# Patient Record
Sex: FEMALE | Race: WHITE | Employment: UNEMPLOYED | URBAN - METROPOLITAN AREA
[De-identification: names, ages, dates, MRNs, and addresses within clinical notes are randomized per-mention and may not be internally consistent; named-entity substitution may affect disease eponyms.]

---

## 2019-11-26 ENCOUNTER — APPOINTMENT (OUTPATIENT)
Dept: GENERAL RADIOLOGY | Age: 34
End: 2019-11-26

## 2019-11-26 ENCOUNTER — HOSPITAL ENCOUNTER (EMERGENCY)
Age: 34
Discharge: HOME | End: 2019-11-26
Attending: EMERGENCY MEDICINE

## 2019-11-26 ENCOUNTER — APPOINTMENT (OUTPATIENT)
Dept: CT IMAGING | Age: 34
End: 2019-11-26

## 2019-11-26 VITALS
HEIGHT: 64 IN | WEIGHT: 120 LBS | BODY MASS INDEX: 20.49 KG/M2 | DIASTOLIC BLOOD PRESSURE: 72 MMHG | HEART RATE: 95 BPM | RESPIRATION RATE: 20 BRPM | SYSTOLIC BLOOD PRESSURE: 127 MMHG | OXYGEN SATURATION: 100 % | TEMPERATURE: 98.8 F

## 2019-11-26 DIAGNOSIS — J18.9 PNEUMONIA DUE TO ORGANISM: Primary | ICD-10-CM

## 2019-11-26 LAB
BACTERIA: NEGATIVE /HPF
BILIRUBIN URINE: NEGATIVE MG/DL
BLOOD, URINE: NEGATIVE
CLARITY: ABNORMAL
COLOR: ABNORMAL
GLUCOSE, URINE: NEGATIVE MG/DL
INTERPRETATION: NORMAL
KETONES, URINE: NEGATIVE MG/DL
LEUKOCYTE ESTERASE, URINE: ABNORMAL
MUCUS: ABNORMAL HPF
NITRITE URINE, QUANTITATIVE: NEGATIVE
PH, URINE: 5 (ref 5–8)
PREGNANCY, URINE: NEGATIVE
PROTEIN UA: NEGATIVE MG/DL
RBC URINE: 2 /HPF (ref 0–6)
SPECIFIC GRAVITY UA: 1.02 (ref 1–1.03)
SPECIFIC GRAVITY, URINE: 1.02 (ref 1–1.03)
SQUAMOUS EPITHELIAL: 6 /HPF
TRICHOMONAS: ABNORMAL /HPF
UROBILINOGEN, URINE: NORMAL MG/DL (ref 0.2–1)
WBC UA: 2 /HPF (ref 0–5)

## 2019-11-26 PROCEDURE — 6360000002 HC RX W HCPCS: Performed by: PHYSICIAN ASSISTANT

## 2019-11-26 PROCEDURE — 81025 URINE PREGNANCY TEST: CPT

## 2019-11-26 PROCEDURE — 6370000000 HC RX 637 (ALT 250 FOR IP): Performed by: PHYSICIAN ASSISTANT

## 2019-11-26 PROCEDURE — 74176 CT ABD & PELVIS W/O CONTRAST: CPT

## 2019-11-26 PROCEDURE — 72100 X-RAY EXAM L-S SPINE 2/3 VWS: CPT

## 2019-11-26 PROCEDURE — 2580000003 HC RX 258: Performed by: PHYSICIAN ASSISTANT

## 2019-11-26 PROCEDURE — 81001 URINALYSIS AUTO W/SCOPE: CPT

## 2019-11-26 PROCEDURE — 99284 EMERGENCY DEPT VISIT MOD MDM: CPT

## 2019-11-26 PROCEDURE — 96374 THER/PROPH/DIAG INJ IV PUSH: CPT

## 2019-11-26 PROCEDURE — 96376 TX/PRO/DX INJ SAME DRUG ADON: CPT

## 2019-11-26 PROCEDURE — 71045 X-RAY EXAM CHEST 1 VIEW: CPT

## 2019-11-26 RX ORDER — AZITHROMYCIN 250 MG/1
TABLET, FILM COATED ORAL
Qty: 1 PACKET | Refills: 0 | Status: SHIPPED | OUTPATIENT
Start: 2019-11-26 | End: 2019-11-30

## 2019-11-26 RX ORDER — 0.9 % SODIUM CHLORIDE 0.9 %
1000 INTRAVENOUS SOLUTION INTRAVENOUS ONCE
Status: COMPLETED | OUTPATIENT
Start: 2019-11-26 | End: 2019-11-26

## 2019-11-26 RX ORDER — LIDOCAINE 4 G/G
1 PATCH TOPICAL ONCE
Status: DISCONTINUED | OUTPATIENT
Start: 2019-11-26 | End: 2019-11-26 | Stop reason: HOSPADM

## 2019-11-26 RX ORDER — KETOROLAC TROMETHAMINE 30 MG/ML
30 INJECTION, SOLUTION INTRAMUSCULAR; INTRAVENOUS ONCE
Status: COMPLETED | OUTPATIENT
Start: 2019-11-26 | End: 2019-11-26

## 2019-11-26 RX ORDER — ALBUTEROL SULFATE 90 UG/1
2 AEROSOL, METERED RESPIRATORY (INHALATION) EVERY 4 HOURS PRN
Qty: 1 INHALER | Refills: 1 | Status: SHIPPED | OUTPATIENT
Start: 2019-11-26 | End: 2019-12-26

## 2019-11-26 RX ADMIN — SODIUM CHLORIDE 1000 ML: 9 INJECTION, SOLUTION INTRAVENOUS at 17:17

## 2019-11-26 RX ADMIN — KETOROLAC TROMETHAMINE 30 MG: 30 INJECTION, SOLUTION INTRAMUSCULAR; INTRAVENOUS at 17:17

## 2019-11-26 RX ADMIN — KETOROLAC TROMETHAMINE 30 MG: 30 INJECTION, SOLUTION INTRAMUSCULAR; INTRAVENOUS at 19:22

## 2019-11-26 ASSESSMENT — ENCOUNTER SYMPTOMS
BACK PAIN: 1
COUGH: 1
ALLERGIC/IMMUNOLOGIC NEGATIVE: 1
GASTROINTESTINAL NEGATIVE: 1
EYES NEGATIVE: 1

## 2019-11-26 ASSESSMENT — PAIN SCALES - GENERAL
PAINLEVEL_OUTOF10: 7
PAINLEVEL_OUTOF10: 10
PAINLEVEL_OUTOF10: 10

## 2019-11-26 ASSESSMENT — PAIN DESCRIPTION - LOCATION: LOCATION: BACK

## 2020-08-09 ENCOUNTER — HOSPITAL ENCOUNTER (OUTPATIENT)
Age: 35
Setting detail: SPECIMEN
Discharge: HOME | End: 2020-08-09
Payer: COMMERCIAL

## 2020-08-09 PROCEDURE — U0002 COVID-19 LAB TEST NON-CDC: HCPCS

## 2020-08-11 LAB
SARS-COV-2: NOT DETECTED
SOURCE: NORMAL

## 2021-03-19 ENCOUNTER — HOSPITAL ENCOUNTER (EMERGENCY)
Age: 36
Discharge: HOME | End: 2021-03-19
Payer: MEDICARE

## 2021-03-19 ENCOUNTER — APPOINTMENT (OUTPATIENT)
Dept: GENERAL RADIOLOGY | Age: 36
End: 2021-03-19
Payer: MEDICARE

## 2021-03-19 VITALS
HEIGHT: 64 IN | SYSTOLIC BLOOD PRESSURE: 108 MMHG | TEMPERATURE: 98.3 F | BODY MASS INDEX: 20.49 KG/M2 | RESPIRATION RATE: 17 BRPM | OXYGEN SATURATION: 99 % | DIASTOLIC BLOOD PRESSURE: 69 MMHG | WEIGHT: 120 LBS | HEART RATE: 101 BPM

## 2021-03-19 DIAGNOSIS — E87.6 HYPOKALEMIA: ICD-10-CM

## 2021-03-19 DIAGNOSIS — T50.901A ACCIDENTAL DRUG OVERDOSE, INITIAL ENCOUNTER: Primary | ICD-10-CM

## 2021-03-19 LAB
ALBUMIN SERPL-MCNC: 4.1 GM/DL (ref 3.4–5)
ALP BLD-CCNC: 55 IU/L (ref 40–128)
ALT SERPL-CCNC: 11 U/L (ref 10–40)
ANION GAP SERPL CALCULATED.3IONS-SCNC: 11 MMOL/L (ref 4–16)
AST SERPL-CCNC: 14 IU/L (ref 15–37)
BASOPHILS ABSOLUTE: 0 K/CU MM
BASOPHILS RELATIVE PERCENT: 0.1 % (ref 0–1)
BILIRUB SERPL-MCNC: 0.4 MG/DL (ref 0–1)
BUN BLDV-MCNC: 17 MG/DL (ref 6–23)
CALCIUM SERPL-MCNC: 8.8 MG/DL (ref 8.3–10.6)
CHLORIDE BLD-SCNC: 100 MMOL/L (ref 99–110)
CHP ED QC CHECK: NORMAL
CO2: 26 MMOL/L (ref 21–32)
CREAT SERPL-MCNC: 1.1 MG/DL (ref 0.6–1.1)
DIFFERENTIAL TYPE: ABNORMAL
EOSINOPHILS ABSOLUTE: 0.1 K/CU MM
EOSINOPHILS RELATIVE PERCENT: 0.4 % (ref 0–3)
GFR AFRICAN AMERICAN: >60 ML/MIN/1.73M2
GFR NON-AFRICAN AMERICAN: 56 ML/MIN/1.73M2
GLUCOSE BLD-MCNC: 103 MG/DL
GLUCOSE BLD-MCNC: 103 MG/DL (ref 70–99)
GLUCOSE BLD-MCNC: 115 MG/DL (ref 70–99)
HCG QUALITATIVE: NEGATIVE
HCT VFR BLD CALC: 38.3 % (ref 37–47)
HEMOGLOBIN: 12.8 GM/DL (ref 12.5–16)
IMMATURE NEUTROPHIL %: 0.3 % (ref 0–0.43)
LYMPHOCYTES ABSOLUTE: 4.8 K/CU MM
LYMPHOCYTES RELATIVE PERCENT: 33.4 % (ref 24–44)
MCH RBC QN AUTO: 31.4 PG (ref 27–31)
MCHC RBC AUTO-ENTMCNC: 33.4 % (ref 32–36)
MCV RBC AUTO: 94.1 FL (ref 78–100)
MONOCYTES ABSOLUTE: 1.2 K/CU MM
MONOCYTES RELATIVE PERCENT: 8.2 % (ref 0–4)
PDW BLD-RTO: 13.1 % (ref 11.7–14.9)
PLATELET # BLD: 354 K/CU MM (ref 140–440)
PLT MORPHOLOGY: ABNORMAL
PMV BLD AUTO: 9.5 FL (ref 7.5–11.1)
POLYCHROMASIA: ABNORMAL
POTASSIUM SERPL-SCNC: 3.2 MMOL/L (ref 3.5–5.1)
PRO-BNP: 111.5 PG/ML
RBC # BLD: 4.07 M/CU MM (ref 4.2–5.4)
SEGMENTED NEUTROPHILS ABSOLUTE COUNT: 8.2 K/CU MM
SEGMENTED NEUTROPHILS RELATIVE PERCENT: 57.6 % (ref 36–66)
SODIUM BLD-SCNC: 137 MMOL/L (ref 135–145)
TOTAL IMMATURE NEUTOROPHIL: 0.04 K/CU MM
TOTAL PROTEIN: 7.6 GM/DL (ref 6.4–8.2)
TROPONIN T: <0.01 NG/ML
WBC # BLD: 14.3 K/CU MM (ref 4–10.5)
WBC # BLD: ABNORMAL 10*3/UL

## 2021-03-19 PROCEDURE — 99285 EMERGENCY DEPT VISIT HI MDM: CPT

## 2021-03-19 PROCEDURE — 2580000003 HC RX 258: Performed by: PHYSICIAN ASSISTANT

## 2021-03-19 PROCEDURE — 71045 X-RAY EXAM CHEST 1 VIEW: CPT

## 2021-03-19 PROCEDURE — 80053 COMPREHEN METABOLIC PANEL: CPT

## 2021-03-19 PROCEDURE — 85025 COMPLETE CBC W/AUTO DIFF WBC: CPT

## 2021-03-19 PROCEDURE — 93010 ELECTROCARDIOGRAM REPORT: CPT | Performed by: INTERNAL MEDICINE

## 2021-03-19 PROCEDURE — 93005 ELECTROCARDIOGRAM TRACING: CPT | Performed by: PHYSICIAN ASSISTANT

## 2021-03-19 PROCEDURE — 84484 ASSAY OF TROPONIN QUANT: CPT

## 2021-03-19 PROCEDURE — 96361 HYDRATE IV INFUSION ADD-ON: CPT

## 2021-03-19 PROCEDURE — 6370000000 HC RX 637 (ALT 250 FOR IP): Performed by: PHYSICIAN ASSISTANT

## 2021-03-19 PROCEDURE — 82962 GLUCOSE BLOOD TEST: CPT

## 2021-03-19 PROCEDURE — 83880 ASSAY OF NATRIURETIC PEPTIDE: CPT

## 2021-03-19 PROCEDURE — 84703 CHORIONIC GONADOTROPIN ASSAY: CPT

## 2021-03-19 PROCEDURE — 96360 HYDRATION IV INFUSION INIT: CPT

## 2021-03-19 RX ORDER — POTASSIUM CHLORIDE 20 MEQ/1
40 TABLET, EXTENDED RELEASE ORAL ONCE
Status: COMPLETED | OUTPATIENT
Start: 2021-03-19 | End: 2021-03-19

## 2021-03-19 RX ORDER — POTASSIUM CHLORIDE 750 MG/1
10 TABLET, EXTENDED RELEASE ORAL DAILY
Qty: 5 TABLET | Refills: 0 | Status: SHIPPED | OUTPATIENT
Start: 2021-03-19 | End: 2021-03-24

## 2021-03-19 RX ORDER — 0.9 % SODIUM CHLORIDE 0.9 %
1000 INTRAVENOUS SOLUTION INTRAVENOUS ONCE
Status: COMPLETED | OUTPATIENT
Start: 2021-03-19 | End: 2021-03-19

## 2021-03-19 RX ADMIN — SODIUM CHLORIDE 1000 ML: 9 INJECTION, SOLUTION INTRAVENOUS at 10:10

## 2021-03-19 RX ADMIN — POTASSIUM CHLORIDE 40 MEQ: 1500 TABLET, EXTENDED RELEASE ORAL at 14:32

## 2021-03-19 NOTE — ED PROVIDER NOTES
12 lead EKG per my interpretation:  Normal Sinus Rhythm at 87  Coleman is   Normal  QTc is  within an acceptable range  There is no specific T wave changes appreciated. There is no specific ST wave changes appreciated. No STEMI    Prior EKG to compare with was NOT available.     Carlos Figueroa MD  03/19/21 1037

## 2021-03-19 NOTE — ED PROVIDER NOTES
Father     Heart Disease Father      Social History     Socioeconomic History    Marital status:      Spouse name: Not on file    Number of children: Not on file    Years of education: Not on file    Highest education level: Not on file   Occupational History    Not on file   Social Needs    Financial resource strain: Not on file    Food insecurity     Worry: Not on file     Inability: Not on file    Transportation needs     Medical: Not on file     Non-medical: Not on file   Tobacco Use    Smoking status: Current Every Day Smoker     Packs/day: 1.00     Years: 20.00     Pack years: 20.00     Types: Cigarettes    Smokeless tobacco: Never Used   Substance and Sexual Activity    Alcohol use: No     Comment: 12 shots of Tequila today    Drug use: Yes     Frequency: 14.0 times per week     Types: IV, Opiates , Methamphetamines     Comment: heroin and meth    Sexual activity: Yes     Partners: Male   Lifestyle    Physical activity     Days per week: Not on file     Minutes per session: Not on file    Stress: Not on file   Relationships    Social connections     Talks on phone: Not on file     Gets together: Not on file     Attends Taoism service: Not on file     Active member of club or organization: Not on file     Attends meetings of clubs or organizations: Not on file     Relationship status: Not on file    Intimate partner violence     Fear of current or ex partner: Not on file     Emotionally abused: Not on file     Physically abused: Not on file     Forced sexual activity: Not on file   Other Topics Concern    Not on file   Social History Narrative    Not on file     Current Facility-Administered Medications   Medication Dose Route Frequency Provider Last Rate Last Admin    potassium chloride (KLOR-CON M) extended release tablet 40 mEq  40 mEq Oral Once Inge Rodriguez PA-C         Current Outpatient Medications   Medication Sig Dispense Refill    potassium chloride (KLOR-CON M) 10 MEQ extended release tablet Take 1 tablet by mouth daily for 5 days 5 tablet 0    albuterol sulfate HFA (PROVENTIL HFA) 108 (90 Base) MCG/ACT inhaler Inhale 2 puffs into the lungs every 4 hours as needed for Wheezing or Shortness of Breath With spacer (and mask if indicated). Thanks. 1 Inhaler 1    promethazine (PHENERGAN) 25 MG tablet Take 1 tablet by mouth every 6 hours as needed for Nausea 12 tablet 0    naproxen (NAPROSYN) 500 MG tablet Take 1 tablet by mouth 2 times daily as needed for Pain 20 tablet 0    clindamycin (CLEOCIN) 150 MG capsule Take 2 capsules by mouth 2 times daily Pharmacist: May substitute 150mg tabs and take 2 tabs per dose. 28 capsule 0     Allergies   Allergen Reactions    Methylprednisolone Swelling and Rash       Nursing Notes Reviewed  PHYSICAL EXAM    VITAL SIGNS: /65   Pulse 83   Temp 98.3 °F (36.8 °C) (Oral)   Resp 11   Ht 5' 4\" (1.626 m)   Wt 120 lb (54.4 kg)   LMP 02/26/2021   SpO2 100%   BMI 20.60 kg/m²    Constitutional:  Well developed, thin female, laying on bed with eyes closed, somnolent but easily arousable with verbal stimuli  Head:  Normocephalic, Atraumatic  Eyes:  EOMI. Sclera clear. Conjunctiva normal, No discharge. Neck/Lymphatics: Supple, no JVD, trachea is midline  Cardiovascular: Borderline tachycardic 102 bpm, regular rhythm. Normal S1/S2. No gross chest wall deformities bruising or discoloration. Equal symmetrical chest wall rise, no paradoxical chest wall movements or flail chest wall segments. No tenderness to the anterior posterior chest wall. Sternum is stable nontender. No tenderness or skin tenting over the clavicles. Peripheral Vascular: Distal pulses 2+, Capillary refill <2seconds  Respiratory:  Respirations nonnlabored, 94% on above on room air, speaking full sentences without difficulty. Clear to auscultation bilaterally, No retractions  Abdomen:   Bowel sounds normal in all quadrants, Soft, Non tender/Nondistended, No palpable abdominal masses. Musculoskeletal: BUE/BLE symmetrical without atrophy or deformities  Integument:  Warm, Dry, Intact, Skin turgor and texture normal  Neurologic:  Alert & oriented x3 , No focal deficits noted. Cranial nerves II through XII grossly intact. No slurred speech. No facial droop. Normal gross motor coordination & motor strength bilateral upper and lower extremities.  No gait ataxia  Psychiatric:  Affect appropriate      I have reviewed and interpreted all of the currently available lab results from this visit (if applicable):  Results for orders placed or performed during the hospital encounter of 03/19/21   CBC auto diff   Result Value Ref Range    WBC 14.3 (H) 4.0 - 10.5 K/CU MM    RBC 4.07 (L) 4.2 - 5.4 M/CU MM    Hemoglobin 12.8 12.5 - 16.0 GM/DL    Hematocrit 38.3 37 - 47 %    MCV 94.1 78 - 100 FL    MCH 31.4 (H) 27 - 31 PG    MCHC 33.4 32.0 - 36.0 %    RDW 13.1 11.7 - 14.9 %    Platelets 847 328 - 597 K/CU MM    MPV 9.5 7.5 - 11.1 FL    Immature Neutrophil % 0.3 0 - 0.43 %    Segs Relative 57.6 36 - 66 %    Eosinophils % 0.4 0 - 3 %    Basophils % 0.1 0 - 1 %    Lymphocytes % 33.4 24 - 44 %    Monocytes % 8.2 (H) 0 - 4 %    Total Immature Neutrophil 0.04 K/CU MM    Segs Absolute 8.2 K/CU MM    Eosinophils Absolute 0.1 K/CU MM    Basophils Absolute 0.0 K/CU MM    Lymphocytes Absolute 4.8 K/CU MM    Monocytes Absolute 1.2 K/CU MM    Differential Type       AUTOMATED DIFF RESULTS CONFIRMED BY SMEAR REVIEW  AUTOMATED DIFFERENTIAL      Polychromasia 1+     WBC Morphology OCCASIONAL     PLT Morphology FEW    CMP   Result Value Ref Range    Sodium 137 135 - 145 MMOL/L    Potassium 3.2 (L) 3.5 - 5.1 MMOL/L    Chloride 100 99 - 110 mMol/L    CO2 26 21 - 32 MMOL/L    BUN 17 6 - 23 MG/DL    CREATININE 1.1 0.6 - 1.1 MG/DL    Glucose 115 (H) 70 - 99 MG/DL    Calcium 8.8 8.3 - 10.6 MG/DL    Albumin 4.1 3.4 - 5.0 GM/DL    Total Protein 7.6 6.4 - 8.2 GM/DL    Total Bilirubin 0.4 0.0 - 1.0 MG/DL    ALT 11 10 - Patient presents with drug overdose and possible CPR in the field by bystanders. Patient is ambulatory through EMS doors on ED arrival, sleepy/somnolent but easily arousable to verbal stimuli. Providing her own HPI. Vitals are stable, borderline tachycardic, 94% on above on room air, speaking full sentences without difficulty. Lungs were clear equal auscultation. Abdomen soft nontender. No obvious chest wall injury or paradoxical chest wall movements. No chest wall tenderness. Regular rate and rhythm. Patient is placed on to telemetry and continuous pulse ox monitoring. Patient is protecting her own airway at this time. CBC with leukocytosis of 14.3 with normal white count. Normal hemoglobin. CMP reveals a mild hypokalemia 3.2, BNP, troponin within normal limits. Serum pregnancy is negative. Chest x-ray shows no evidence of a cardiopulmonary process or evidence of displaced rib fracture. EKG is also negative for evidence of acute ischemic changes. Patient was observed in the ED for over 3 hours, continues to be slightly sleepy and somnolent, protecting airway, 95% above on room air. She is easily arousable, talkative. She did tolerate a full meal tray and was amatory in the ED with a steady gait. At this time, do think patient is appropriate for discharge home outpatient follow-up. Will be given referral to [facility] for outpatient drug rehab. She is adamantly denying any intentional drug overdose for suicide attempt. She did not require any Narcan with EMS or while in the emergency department. Low clinical suspicion for malignant cardiac practice, pneumothorax/pneumothorax, ACS, hypoglycemia, sepsis. Tachycardia did resolve with IV fluids. Patient is discharged in stable condition with above follow-up. Given prescription for potassium for repletion, first oral dose given in the ED. Return warnings back to the ED discussed.       In light of current events, I did utilize appropriate PPE (including N95 face mask, safety glasses, gloves as recommended by the health facility/national standard best practice, during my bedside interactions with the patient. Patient was masked throughout ED course. Full droplet precaution as well as full PPE was followed throughout patient's ED course and evaluation. Attending physician - Dr. Lesia Hope - was consulted on this case, but did not independently evaluate the patient         Diagnosis and plan discussed in detail with patient who understands and agrees. Patient agrees to return emergency department if symptoms worsen or any new symptoms develop.       Disposition referral (if applicable):  Alannah Castrejon Bem [address]io [address]  Go to       [facility] Emergency Department  [address] [facility]  [phone]  Go to   As needed, If symptoms worsen    Carlos Fraser MD  [facility] [id number] [phone]    Schedule an appointment as soon as possible for a visit in 1 week  As needed, If symptoms worsen and to establish PCP care      Disposition medications (if applicable):  New Prescriptions    POTASSIUM CHLORIDE (KLOR-CON M) 10 MEQ EXTENDED RELEASE TABLET    Take 1 tablet by mouth daily for 5 days         (Please note that portions of this note may have been completed with a voice recognition program. Efforts were made to edit the dictations but occasionally words are mis-transcribed.)          Christiano Santiago PA-C  03/19/21 4736

## 2021-03-19 NOTE — ED NOTES
The patient is talking now and sts she was using meth and heroin today, and took 1200 gabapentin all at 0400 this morning.       Kael Lewis RN  03/19/21 1450

## 2021-03-19 NOTE — ED NOTES
Bed: ED-18  Expected date:   Expected time:   Means of arrival:   Comments:  Medic possible od, cpr per bystanders, pt alert and oriented with medics and spd     Husain Road, RN  03/19/21 6673

## 2021-03-23 LAB
EKG ATRIAL RATE: 87 BPM
EKG DIAGNOSIS: NORMAL
EKG P AXIS: 12 DEGREES
EKG P-R INTERVAL: 126 MS
EKG Q-T INTERVAL: 364 MS
EKG QRS DURATION: 96 MS
EKG QTC CALCULATION (BAZETT): 438 MS
EKG R AXIS: -10 DEGREES
EKG T AXIS: -4 DEGREES
EKG VENTRICULAR RATE: 87 BPM

## 2021-03-25 ENCOUNTER — HOSPITAL ENCOUNTER (EMERGENCY)
Age: 36
Discharge: HOME | End: 2021-03-26
Payer: COMMERCIAL
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
## 2021-03-25 PROCEDURE — 99285 EMERGENCY DEPT VISIT HI MDM: CPT

## 2021-03-25 PROCEDURE — 2720000011 HC SANE KIT SUPPLY STERILE

## 2021-03-26 VITALS
BODY MASS INDEX: 20.49 KG/M2 | SYSTOLIC BLOOD PRESSURE: 109 MMHG | RESPIRATION RATE: 14 BRPM | HEART RATE: 83 BPM | WEIGHT: 120 LBS | OXYGEN SATURATION: 98 % | HEIGHT: 64 IN | DIASTOLIC BLOOD PRESSURE: 72 MMHG | TEMPERATURE: 98.5 F

## 2021-03-26 NOTE — ED TRIAGE NOTES
Pt asks that someone \"look at her eyes\" because \"they are matted closed when I wake up in the morning\"
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
## 2021-10-25 ENCOUNTER — HOSPITAL ENCOUNTER (OUTPATIENT)
Age: 36
Setting detail: SPECIMEN
Discharge: HOME | End: 2021-10-25
Payer: MEDICARE

## 2021-10-25 LAB
SARS-COV-2: NOT DETECTED
SOURCE: NORMAL

## 2021-10-25 PROCEDURE — U0003 INFECTIOUS AGENT DETECTION BY NUCLEIC ACID (DNA OR RNA); SEVERE ACUTE RESPIRATORY SYNDROME CORONAVIRUS 2 (SARS-COV-2) (CORONAVIRUS DISEASE [COVID-19]), AMPLIFIED PROBE TECHNIQUE, MAKING USE OF HIGH THROUGHPUT TECHNOLOGIES AS DESCRIBED BY CMS-2020-01-R: HCPCS

## 2021-10-25 PROCEDURE — U0005 INFEC AGEN DETEC AMPLI PROBE: HCPCS

## 2021-12-27 ENCOUNTER — APPOINTMENT (OUTPATIENT)
Dept: GENERAL RADIOLOGY | Age: 36
End: 2021-12-27
Payer: MEDICARE

## 2021-12-27 ENCOUNTER — HOSPITAL ENCOUNTER (EMERGENCY)
Age: 36
Discharge: HOME | End: 2021-12-28
Payer: MEDICARE

## 2021-12-27 DIAGNOSIS — T50.901A ACCIDENTAL DRUG OVERDOSE, INITIAL ENCOUNTER: Primary | ICD-10-CM

## 2021-12-27 PROCEDURE — 99283 EMERGENCY DEPT VISIT LOW MDM: CPT

## 2021-12-27 PROCEDURE — 71045 X-RAY EXAM CHEST 1 VIEW: CPT

## 2021-12-27 ASSESSMENT — PAIN SCALES - GENERAL: PAINLEVEL_OUTOF10: 7

## 2021-12-28 VITALS
SYSTOLIC BLOOD PRESSURE: 116 MMHG | HEART RATE: 79 BPM | OXYGEN SATURATION: 97 % | DIASTOLIC BLOOD PRESSURE: 74 MMHG | TEMPERATURE: 97.1 F | RESPIRATION RATE: 14 BRPM

## 2021-12-28 LAB
EKG ATRIAL RATE: 69 BPM
EKG DIAGNOSIS: NORMAL
EKG P AXIS: -14 DEGREES
EKG P-R INTERVAL: 138 MS
EKG Q-T INTERVAL: 398 MS
EKG QRS DURATION: 98 MS
EKG QTC CALCULATION (BAZETT): 426 MS
EKG R AXIS: -17 DEGREES
EKG T AXIS: -4 DEGREES
EKG VENTRICULAR RATE: 69 BPM

## 2021-12-28 PROCEDURE — 93010 ELECTROCARDIOGRAM REPORT: CPT | Performed by: INTERNAL MEDICINE

## 2021-12-28 PROCEDURE — 93005 ELECTROCARDIOGRAM TRACING: CPT | Performed by: PHYSICIAN ASSISTANT
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
## 2021-12-28 NOTE — ED PROVIDER NOTES
EKG Interpretation    Interpreted by emergency department physician from Dec 28 at [address]: normal sinus   Rate: normal  Axis: left  Ectopy: Sinus arrhythmia  Conduction: Incomplete right bundle branch block  ST Segments: no acute change  T Waves: no acute change  Q Waves: none    Clinical Impression: Sinus arrhythmia with a rate of 69 and incomplete right bundle branch block, no ischemia or ectopy    MD Antelmo Wooten MD  12/28/21 7080

## 2021-12-28 NOTE — ED NOTES
Dr. Vidales contacted this RN, had been paged regarding critical phenytoin result from ACL lab for patient (phenytoin 38.7). Dr. Vidales would like this RN to contact patient at this time and assess for symptoms of toxicity including increased falls, nausea, vomiting, increased confusion, nystagmus. If any of those symptoms are present, patient should be instructed to go to ED, otherwise if patient is not experiencing those symptoms he should contact Dr. Floyd (physician who prescribes/manages patient's phenytoin) right away in the morning regarding dosing. Dr. Vidales's instructions read back by this RN and confirmed.     This RN contacted patient. Patient is not experiencing increased falls, nausea, vomiting, increased confusion, or nystagmus. States he has not had any development or worsening symptoms since office visit this morning - has been experiencing ongoing dizziness but again this has not worsened. Discussed with patient to continue to monitor for these symptoms overnight and if no changes overnight, instructed patient to contact Dr. Floyd when office opens in the morning to discuss phenytoin. Patient verified understanding. Encouraged patient to also call back overnight if he has further questions or concerns. Patient verified understanding.         Reason for Disposition  • Nursing judgment    Protocols used: NO GUIDELINE OR REFERENCE [id number]       Patients  attempted CPR on her, patient now experiencing chest pain that worsens with movement.       Philipp Flanagan RN  12/27/21 5396

## 2022-11-11 ENCOUNTER — HOSPITAL ENCOUNTER (OUTPATIENT)
Age: 37
Discharge: HOME | End: 2022-11-11
Payer: MEDICARE

## 2022-11-11 LAB
ALBUMIN SERPL-MCNC: 4.2 GM/DL (ref 3.4–5)
ALP BLD-CCNC: 59 IU/L (ref 40–129)
ALT SERPL-CCNC: 37 U/L (ref 10–40)
AST SERPL-CCNC: 35 IU/L (ref 15–37)
BILIRUB SERPL-MCNC: 0.7 MG/DL (ref 0–1)
BILIRUBIN DIRECT: 0.2 MG/DL (ref 0–0.3)
BILIRUBIN, INDIRECT: 0.5 MG/DL (ref 0–0.7)
HEPATITIS B SURFACE ANTIGEN: NON REACTIVE
HEPATITIS C ANTIBODY: ABNORMAL
TOTAL PROTEIN: 6.7 GM/DL (ref 6.4–8.2)

## 2022-11-11 PROCEDURE — 87389 HIV-1 AG W/HIV-1&-2 AB AG IA: CPT

## 2022-11-11 PROCEDURE — 80076 HEPATIC FUNCTION PANEL: CPT

## 2022-11-11 PROCEDURE — 36415 COLL VENOUS BLD VENIPUNCTURE: CPT

## 2022-11-11 PROCEDURE — 86592 SYPHILIS TEST NON-TREP QUAL: CPT

## 2022-11-11 PROCEDURE — 86803 HEPATITIS C AB TEST: CPT

## 2022-11-11 PROCEDURE — 86708 HEPATITIS A ANTIBODY: CPT

## 2022-11-11 PROCEDURE — 87340 HEPATITIS B SURFACE AG IA: CPT

## 2022-11-12 LAB — HIV SCREEN: NON REACTIVE

## 2022-11-13 LAB — HAV AB SERPL IA-ACNC: POSITIVE

## 2022-11-14 LAB — RPR: NON REACTIVE

## 2022-12-31 ENCOUNTER — APPOINTMENT (OUTPATIENT)
Dept: GENERAL RADIOLOGY | Age: 37
End: 2022-12-31
Payer: MEDICARE

## 2022-12-31 ENCOUNTER — HOSPITAL ENCOUNTER (EMERGENCY)
Age: 37
Discharge: HOME | End: 2022-12-31
Payer: MEDICARE

## 2022-12-31 VITALS
OXYGEN SATURATION: 98 % | RESPIRATION RATE: 14 BRPM | HEIGHT: 65 IN | HEART RATE: 52 BPM | SYSTOLIC BLOOD PRESSURE: 106 MMHG | DIASTOLIC BLOOD PRESSURE: 63 MMHG | WEIGHT: 155 LBS | TEMPERATURE: 97.9 F | BODY MASS INDEX: 25.83 KG/M2

## 2022-12-31 DIAGNOSIS — R07.9 CHEST PAIN, UNSPECIFIED TYPE: Primary | ICD-10-CM

## 2022-12-31 LAB
ALBUMIN SERPL-MCNC: 4.3 GM/DL (ref 3.4–5)
ALP BLD-CCNC: 53 IU/L (ref 40–129)
ALT SERPL-CCNC: 64 U/L (ref 10–40)
ANION GAP SERPL CALCULATED.3IONS-SCNC: 9 MMOL/L (ref 4–16)
AST SERPL-CCNC: 44 IU/L (ref 15–37)
BASOPHILS ABSOLUTE: 0 K/CU MM
BASOPHILS RELATIVE PERCENT: 0.4 % (ref 0–1)
BILIRUB SERPL-MCNC: 0.8 MG/DL (ref 0–1)
BUN BLDV-MCNC: 11 MG/DL (ref 6–23)
CALCIUM SERPL-MCNC: 9 MG/DL (ref 8.3–10.6)
CHLORIDE BLD-SCNC: 100 MMOL/L (ref 99–110)
CHP ED QC CHECK: YES
CO2: 25 MMOL/L (ref 21–32)
CREAT SERPL-MCNC: 0.7 MG/DL (ref 0.6–1.1)
DIFFERENTIAL TYPE: ABNORMAL
EKG ATRIAL RATE: 56 BPM
EKG DIAGNOSIS: NORMAL
EKG P AXIS: 36 DEGREES
EKG P-R INTERVAL: 126 MS
EKG Q-T INTERVAL: 402 MS
EKG QRS DURATION: 88 MS
EKG QTC CALCULATION (BAZETT): 387 MS
EKG R AXIS: -36 DEGREES
EKG T AXIS: 20 DEGREES
EKG VENTRICULAR RATE: 56 BPM
EOSINOPHILS ABSOLUTE: 0.1 K/CU MM
EOSINOPHILS RELATIVE PERCENT: 1.3 % (ref 0–3)
GFR SERPL CREATININE-BSD FRML MDRD: >60 ML/MIN/1.73M2
GLUCOSE BLD-MCNC: 102 MG/DL (ref 70–99)
HCT VFR BLD CALC: 41.7 % (ref 37–47)
HEMOGLOBIN: 13.6 GM/DL (ref 12.5–16)
IMMATURE NEUTROPHIL %: 0.2 % (ref 0–0.43)
LYMPHOCYTES ABSOLUTE: 4 K/CU MM
LYMPHOCYTES RELATIVE PERCENT: 49 % (ref 24–44)
MCH RBC QN AUTO: 30.3 PG (ref 27–31)
MCHC RBC AUTO-ENTMCNC: 32.6 % (ref 32–36)
MCV RBC AUTO: 92.9 FL (ref 78–100)
MONOCYTES ABSOLUTE: 0.7 K/CU MM
MONOCYTES RELATIVE PERCENT: 8.7 % (ref 0–4)
NUCLEATED RBC %: 0 %
PDW BLD-RTO: 13.9 % (ref 11.7–14.9)
PLATELET # BLD: 330 K/CU MM (ref 140–440)
PMV BLD AUTO: 9.9 FL (ref 7.5–11.1)
POTASSIUM SERPL-SCNC: 4.1 MMOL/L (ref 3.5–5.1)
PREGNANCY TEST URINE, POC: NEGATIVE
RBC # BLD: 4.49 M/CU MM (ref 4.2–5.4)
SEGMENTED NEUTROPHILS ABSOLUTE COUNT: 3.3 K/CU MM
SEGMENTED NEUTROPHILS RELATIVE PERCENT: 40.4 % (ref 36–66)
SODIUM BLD-SCNC: 134 MMOL/L (ref 135–145)
TOTAL IMMATURE NEUTOROPHIL: 0.02 K/CU MM
TOTAL NUCLEATED RBC: 0 K/CU MM
TOTAL PROTEIN: 7.5 GM/DL (ref 6.4–8.2)
TROPONIN T: <0.01 NG/ML
WBC # BLD: 8.2 K/CU MM (ref 4–10.5)

## 2022-12-31 PROCEDURE — 71045 X-RAY EXAM CHEST 1 VIEW: CPT

## 2022-12-31 PROCEDURE — 80053 COMPREHEN METABOLIC PANEL: CPT

## 2022-12-31 PROCEDURE — 84484 ASSAY OF TROPONIN QUANT: CPT

## 2022-12-31 PROCEDURE — 85025 COMPLETE CBC W/AUTO DIFF WBC: CPT

## 2022-12-31 PROCEDURE — 93005 ELECTROCARDIOGRAM TRACING: CPT | Performed by: PHYSICIAN ASSISTANT

## 2022-12-31 PROCEDURE — 93010 ELECTROCARDIOGRAM REPORT: CPT | Performed by: INTERNAL MEDICINE

## 2022-12-31 PROCEDURE — 99285 EMERGENCY DEPT VISIT HI MDM: CPT

## 2022-12-31 ASSESSMENT — PAIN DESCRIPTION - LOCATION: LOCATION: CHEST;BACK

## 2022-12-31 ASSESSMENT — PAIN SCALES - GENERAL: PAINLEVEL_OUTOF10: 4

## 2022-12-31 NOTE — ED PROVIDER NOTES
[address] ENCOUNTER        Pt Name: Mellisa Cason  MRN: [id number]  [place] 1985  Date of evaluation: 12/31/2022  Provider: Beatrice Henry PA-C  PCP: No primary care provider on file. Note Started: 10:19 AM EST      SAE. I have evaluated this patient. My supervising physician was available for consultation. Triage CHIEF COMPLAINT       Chief Complaint   Patient presents with    Chest Pain     States in upper back between shoulder blades. Been on and off for a couple months         HISTORY OF PRESENT ILLNESS   (Location/Symptom, Timing/Onset, Context/Setting, Quality, Duration, Modifying Factors, Severity)  Note limiting factors. Chief Complaint: Chest pain    Mellisa Cason is a 42 y.o. female who presents complaint of chest pain and mid scapular pain. She describes it as pressure. She states these have been ongoing intermittently over the past several months, mentioning that she has had no previous work-up for these, came in today with return of the symptoms. She feels last night it might of been also going up into her neck but otherwise she denies any radiation or migration. She denies any alleviating or aggravating factors, mentioning today she was just resting when the symptoms started. Denies any diaphoresis, nausea, vomiting, hemoptysis, URI symptoms, fever, abdominal pain, extremity pain or swelling    Nursing Notes were all reviewed and agreed with or any disagreements were addressed in the HPI. REVIEW OF SYSTEMS    (2-9 systems for level 4, 10 or more for level 5)     Review of Systems   Constitutional:  Negative for chills and fever. HENT:  Negative for congestion and rhinorrhea. Eyes:  Negative for pain and visual disturbance. Respiratory:  Negative for cough and shortness of breath. Cardiovascular:  Positive for chest pain. Negative for leg swelling.    Gastrointestinal: Negative for abdominal pain, diarrhea, nausea and vomiting. Genitourinary:  Negative for dysuria and hematuria. Musculoskeletal:  Negative for back pain and myalgias. Skin:  Negative for rash and wound. Neurological:  Negative for dizziness and light-headedness. PAST MEDICAL HISTORY     Past Medical History:   Diagnosis Date    Epilepsy [facility])        SURGICAL HISTORY     Past Surgical History:   Procedure Laterality Date    OVARY REMOVAL      R ovary    TUBAL LIGATION         CURRENTMEDICATIONS       Discharge Medication List as of 12/31/2022  1:02 PM        CONTINUE these medications which have NOT CHANGED    Details   potassium chloride (KLOR-CON M) 10 MEQ extended release tablet Take 1 tablet by mouth daily for 5 days, Disp-5 tablet, R-0Normal      albuterol sulfate HFA (PROVENTIL HFA) 108 (90 Base) MCG/ACT inhaler Inhale 2 puffs into the lungs every 4 hours as needed for Wheezing or Shortness of Breath With spacer (and mask if indicated). Thanks. , Disp-1 Inhaler, R-1Print      promethazine (PHENERGAN) 25 MG tablet Take 1 tablet by mouth every 6 hours as needed for Nausea, Disp-12 tablet, R-0Print      naproxen (NAPROSYN) 500 MG tablet Take 1 tablet by mouth 2 times daily as needed for Pain, Disp-20 tablet, R-0Print      clindamycin (CLEOCIN) 150 MG capsule Take 2 capsules by mouth 2 times daily Pharmacist: May substitute 150mg tabs and take 2 tabs per dose., Disp-28 capsule, R-0Print             ALLERGIES     Methylprednisolone    FAMILYHISTORY       Family History   Problem Relation Age of Onset    Cancer Father     Heart Disease Father         SOCIAL HISTORY       Social History     Socioeconomic History    Marital status:      Spouse name: None    Number of children: None    Years of education: None    Highest education level: None   Tobacco Use    Smoking status: Every Day     Packs/day: 1.00     Years: 20.00     Pack years: 20.00     Types: Cigarettes    Smokeless tobacco: Never   Vaping Use    Vaping Use: Never used   Substance and Sexual Activity    Alcohol use: No     Comment: 12 shots of Tequila today    Drug use: Yes     Frequency: 14.0 times per week     Types: IV, Opiates , Methamphetamines (Crystal Meth)     Comment: heroin and meth    Sexual activity: Yes     Partners: Male       SCREENINGS    Valerie Coma Scale  Eye Opening: Spontaneous  Best Verbal Response: Oriented  Best Motor Response: Obeys commands  [place] Coma Scale Score: 15        PHYSICAL EXAM    (up to 7 for level 4, 8 or more for level 5)     ED Triage Vitals [12/31/22 1002]   BP Temp Temp src Heart Rate Resp SpO2 Height Weight   124/72 97.9 °F (36.6 °C) -- 68 16 100 % 5' 4.5\" (1.638 m) 155 lb (70.3 kg)       Physical Exam  Vitals and nursing note reviewed. Constitutional:       Appearance: Normal appearance. She is well-developed. She is not ill-appearing or diaphoretic. HENT:      Head: Normocephalic and atraumatic. Eyes:      General: No scleral icterus. Right eye: No discharge. Left eye: No discharge. Cardiovascular:      Rate and Rhythm: Normal rate and regular rhythm. Heart sounds: Normal heart sounds. No murmur heard. No friction rub. No gallop. Pulmonary:      Effort: Pulmonary effort is normal. No respiratory distress. Breath sounds: Normal breath sounds. No stridor. No wheezing or rales. Chest:      Chest wall: No tenderness. Abdominal:      General: Bowel sounds are normal. There is no distension. Palpations: Abdomen is soft. There is no mass. Tenderness: There is no abdominal tenderness. There is no guarding or rebound. Musculoskeletal:         General: No tenderness. Normal range of motion. Cervical back: Normal range of motion and neck supple. Skin:     General: Skin is warm and dry. Coloration: Skin is not pale. Neurological:      Mental Status: She is alert and oriented to person, place, and time.       Coordination: Coordination normal. Psychiatric:         Mood and Affect: Mood normal.         Behavior: Behavior normal.       EMERGENCY DEPARTMENT COURSE:             Pt is a 42 y.o. female who presents with above HPI. Saw patient shortly after arrival to exam room she is alert she is oriented no acute distress. Vitals are within normal limits. She is describing some intermittent chest pain, though previous work-up. Emergent conditions considered. We will plan for chest pain work-up. Patient is PERC negative and low risk for PE per Wells criteria. Initial EKG today no acute findings. plan for blood work, ASA, chest x-ray . Patient declines anything for her comfort at this point    Reviewing EMR, patient has history of subs abuse disorder overdose. Again today she is alert and oriented does not appear to be intoxicated. Denies any SIs, HI's, and she is describing some \"brain shocks\", but denies any headache, and on examination no focal neurodeficits. Patient was given thefollowing medications:  Medications - No data to display        DIFFERENTIAL DIAGNOSIS/MDM:       Patient's work-up is unremarkable. Chest x-ray unremarkable. Normal troponin. Patient is PERC negative and low risk for PE per Wells criteria. Low risk heart score. Symptoms have been ongoing for months. This time, feel she safe for outpatient management. I discussed this with her and she is agreeable. PCP and cardiology resources provided. MDM:  Patient presented with chest pain. Given history and presentation cardiac workup initiated. Patient had labs, EKG, x-ray and troponin ordered. Patient was placed on monitor. Patient's pulse ox is normal.      HEART Score: 1    Patient's chest x-ray is read by radiology as negative for acute abnormality    The patient's initial troponin is within the normal range. Patient's EKG did not show any acute diagnostic ischemic changes. Patient does not have any acute hemodynamic instability.     I completed a HEART PATHWAY to screen for Acute Coronary Syndrome (ACS) in this patient. The evidence indicates that the patient is very low risk for ACS and this is consistent with my clinical intuition. The risk of further workup or hospitalization is likely higher than the risk of the patient having an ACS. It is, therefore, in the patients best interest not to do additional emergent testing or be hospitalized  Comment: Please note this report has been produced using speech recognition software and may contain errors related to that system including errors in grammar, punctuation, and spelling, as well as words and phrases that may be inappropriate. Efforts were made to edit the dictations. Is this patient to be included in the SEP-1 Core Measure due to severe sepsis or septic shock? No   Exclusion criteria - the patient is NOT to be included for SEP-1 Core Measure due to:  2+ SIRS criteria are not met    Vitals:    Vitals:    12/31/22 1002 12/31/22 1005 12/31/22 1006 12/31/22 1302   BP: 124/72 124/72  106/63   Pulse: 68  70 52   Resp: 16  17 14   Temp: 97.9 °F (36.6 °C)      SpO2: 100% 98% 100% 98%   Weight: 155 lb (70.3 kg)      Height: 5' 4.5\" (1.638 m)          CONSULTS:   None     CRITICAL CARE TIME   N/A      DIAGNOSTIC RESULTS   LABS:    Labs Reviewed   CBC WITH AUTO DIFFERENTIAL - Abnormal; Notable for the following components:       Result Value    Lymphocytes % 49.0 (*)     Monocytes % 8.7 (*)     All other components within normal limits   COMPREHENSIVE METABOLIC PANEL - Abnormal; Notable for the following components:    Sodium 134 (*)     Glucose 102 (*)     ALT 64 (*)     AST 44 (*)     All other components within normal limits   POCT URINE PREGNANCY - Normal   TROPONIN       When ordered, only abnormal lab results are displayed. All other labs were within normal range or not returned as of this dictation. EKG:  When ordered, EKG's are interpreted by the Emergency Department Physician in the absence of a cardiologist.  Please see their note for interpretation of EKG. RADIOLOGY:   Non-plain film images such as CT, Ultrasound and MRI are read by the radiologist. Plain radiographic images are visualized andpreliminarily interpreted by the  ED Provider with the below findings:      Interpretation perthe Radiologist below, if available at the time of this note:    XR CHEST PORTABLE   Final Result   No acute process. No results found. PROCEDURES   Unless otherwise noted below, none     Procedures      FINAL IMPRESSION      1.  Chest pain, unspecified type          DISPOSITION/PLAN   DISPOSITION Decision To Discharge 12/31/2022 01:10:13 PM      PATIENT REFERREDTO:  Rohini Choe MD  [address]     In 1 week  For reevaluation    Yosi Ruvalcaba MD  [address]  [phone]    In 1 week  For reevaluation    DISCHARGE MEDICATIONS:  Discharge Medication List as of 12/31/2022  1:02 PM          DISCONTINUED MEDICATIONS:  Discharge Medication List as of 12/31/2022  1:02 PM                 (Please note that portions ofthis note were completed with a voice recognition program.  Efforts were made to edit the dictations but occasionally words are mis-transcribed.)    Dragan Nugent PA-C (electronically signed)             Dragan Nugent PA-C  01/01/23 2051

## 2022-12-31 NOTE — DISCHARGE INSTRUCTIONS
Yes, follow-up with a primary care provider and/or the cardiologist for reevaluation of your symptoms discuss your visit to the emergency department. Return with any difficulty breathing, passing out, coughing up blood, fever, worsening symptoms or any new concerns.

## 2022-12-31 NOTE — ED PROVIDER NOTES
EKG interpreted by me:  Sinus bradycardia with sinus arrhythmia, rate of 56 bpm, normal intervals. Otherwise normal EKG.   No previous to compare     Marily Acosta MD  12/31/22 1013

## 2023-01-01 ASSESSMENT — ENCOUNTER SYMPTOMS
DIARRHEA: 0
NAUSEA: 0
BACK PAIN: 0
RHINORRHEA: 0
COUGH: 0
VOMITING: 0
ABDOMINAL PAIN: 0
EYE PAIN: 0
SHORTNESS OF BREATH: 0

## 2023-07-27 ENCOUNTER — HOSPITAL ENCOUNTER (OUTPATIENT)
Dept: PSYCHIATRY | Age: 38
Setting detail: THERAPIES SERIES
Discharge: HOME | End: 2023-07-27
Payer: MEDICAID

## 2023-07-27 PROCEDURE — 90791 PSYCH DIAGNOSTIC EVALUATION: CPT

## 2023-07-27 PROCEDURE — 80305 DRUG TEST PRSMV DIR OPT OBS: CPT

## 2023-07-27 ASSESSMENT — ANXIETY QUESTIONNAIRES
4. TROUBLE RELAXING: 2
7. FEELING AFRAID AS IF SOMETHING AWFUL MIGHT HAPPEN: 3
5. BEING SO RESTLESS THAT IT IS HARD TO SIT STILL: 0
IF YOU CHECKED OFF ANY PROBLEMS ON THIS QUESTIONNAIRE, HOW DIFFICULT HAVE THESE PROBLEMS MADE IT FOR YOU TO DO YOUR WORK, TAKE CARE OF THINGS AT HOME, OR GET ALONG WITH OTHER PEOPLE: EXTREMELY DIFFICULT
1. FEELING NERVOUS, ANXIOUS, OR ON EDGE: 3
6. BECOMING EASILY ANNOYED OR IRRITABLE: 3
3. WORRYING TOO MUCH ABOUT DIFFERENT THINGS: 3
2. NOT BEING ABLE TO STOP OR CONTROL WORRYING: 3
GAD7 TOTAL SCORE: 17

## 2023-07-27 ASSESSMENT — PATIENT HEALTH QUESTIONNAIRE - PHQ9: SUM OF ALL RESPONSES TO PHQ QUESTIONS 1-9: 15
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
## 2023-08-02 ENCOUNTER — HOSPITAL ENCOUNTER (OUTPATIENT)
Dept: PSYCHIATRY | Age: 38
Setting detail: THERAPIES SERIES
Discharge: HOME | End: 2023-08-02

## 2023-08-02 NOTE — GROUP NOTE
[facility] Group Therapy Note      8/2/2023    Location:  [place]      Clients Presents: 5934, 1339    Clients Absent: 1495, 1480    Length of session: 1.5 hours    Group Note: OP    Group Type: Women's    New members were welcomed and introduced. Norms and expectations of group were discussed. Content: Counselor presented a topic focused discussion on types of relationships. Client identified the difference between casual and personal relationships. Client identified characteristics of unhealthy vs. Healthy relationships. Client kodak a relationship diagram with the people she considers to be closest and most important for recovery.       Ulices Davies [address]  8/2/2023 5:30 PM    Co-Therapist: N/A      [facility] Individual Group Progress Note    Timothy Rebollar  1985 8/2/2023    Notes on Client Progress in Group    Reason for Absence: DNS     Ulices Davies [address]  8/2/2023 5:09 PM    Co-Therapist: N/A

## 2023-08-03 ENCOUNTER — HOSPITAL ENCOUNTER (OUTPATIENT)
Dept: PSYCHIATRY | Age: 38
Setting detail: THERAPIES SERIES
Discharge: HOME | End: 2023-08-03

## 2023-08-03 NOTE — PROGRESS NOTES
[facility]        Individual  Progress Note    Location: [x] [place] [] [facility]                   Patient Name: Megan Ojeda   : 1985     Case # :  [id number]  Therapist: JOSE Urban        Objective/Service/Time:     Client did not show for her second part of her assessment. A letter will be sent.          Electronically signed by Zora Patiño on 8/3/2023 at 2:14 PM

## 2023-08-06 ENCOUNTER — APPOINTMENT (OUTPATIENT)
Dept: CT IMAGING | Age: 38
End: 2023-08-06
Payer: MEDICAID

## 2023-08-06 ENCOUNTER — HOSPITAL ENCOUNTER (EMERGENCY)
Age: 38
Discharge: HOME | End: 2023-08-06
Attending: EMERGENCY MEDICINE
Payer: MEDICAID

## 2023-08-06 VITALS
RESPIRATION RATE: 16 BRPM | TEMPERATURE: 97.9 F | HEART RATE: 68 BPM | SYSTOLIC BLOOD PRESSURE: 124 MMHG | DIASTOLIC BLOOD PRESSURE: 78 MMHG | OXYGEN SATURATION: 97 %

## 2023-08-06 DIAGNOSIS — R07.9 CHEST PAIN, UNSPECIFIED TYPE: ICD-10-CM

## 2023-08-06 DIAGNOSIS — F41.1 ANXIETY STATE: ICD-10-CM

## 2023-08-06 DIAGNOSIS — S00.83XA CONTUSION OF FACE, INITIAL ENCOUNTER: ICD-10-CM

## 2023-08-06 DIAGNOSIS — S09.90XA CLOSED HEAD INJURY, INITIAL ENCOUNTER: Primary | ICD-10-CM

## 2023-08-06 LAB
EKG ATRIAL RATE: 55 BPM
EKG DIAGNOSIS: NORMAL
EKG P AXIS: 30 DEGREES
EKG P-R INTERVAL: 170 MS
EKG Q-T INTERVAL: 412 MS
EKG QRS DURATION: 92 MS
EKG QTC CALCULATION (BAZETT): 394 MS
EKG R AXIS: -20 DEGREES
EKG T AXIS: 15 DEGREES
EKG VENTRICULAR RATE: 55 BPM

## 2023-08-06 PROCEDURE — 71260 CT THORAX DX C+: CPT

## 2023-08-06 PROCEDURE — 70498 CT ANGIOGRAPHY NECK: CPT

## 2023-08-06 PROCEDURE — 99285 EMERGENCY DEPT VISIT HI MDM: CPT

## 2023-08-06 PROCEDURE — 70450 CT HEAD/BRAIN W/O DYE: CPT

## 2023-08-06 PROCEDURE — 6360000004 HC RX CONTRAST MEDICATION: Performed by: EMERGENCY MEDICINE

## 2023-08-06 PROCEDURE — 6370000000 HC RX 637 (ALT 250 FOR IP): Performed by: EMERGENCY MEDICINE

## 2023-08-06 PROCEDURE — 93005 ELECTROCARDIOGRAM TRACING: CPT | Performed by: EMERGENCY MEDICINE

## 2023-08-06 PROCEDURE — 6360000002 HC RX W HCPCS: Performed by: EMERGENCY MEDICINE

## 2023-08-06 PROCEDURE — 93010 ELECTROCARDIOGRAM REPORT: CPT | Performed by: INTERNAL MEDICINE

## 2023-08-06 PROCEDURE — 96374 THER/PROPH/DIAG INJ IV PUSH: CPT

## 2023-08-06 RX ORDER — CYCLOBENZAPRINE HCL 10 MG
10 TABLET ORAL 3 TIMES DAILY PRN
Qty: 21 TABLET | Refills: 0 | Status: SHIPPED | OUTPATIENT
Start: 2023-08-06 | End: 2023-08-16

## 2023-08-06 RX ORDER — LORAZEPAM 1 MG/1
1 TABLET ORAL ONCE
Status: COMPLETED | OUTPATIENT
Start: 2023-08-06 | End: 2023-08-06

## 2023-08-06 RX ORDER — LIDOCAINE 50 MG/G
1 PATCH TOPICAL DAILY
Qty: 30 PATCH | Refills: 0 | Status: SHIPPED | OUTPATIENT
Start: 2023-08-06 | End: 2023-09-05

## 2023-08-06 RX ORDER — KETOROLAC TROMETHAMINE 15 MG/ML
15 INJECTION, SOLUTION INTRAMUSCULAR; INTRAVENOUS ONCE
Status: COMPLETED | OUTPATIENT
Start: 2023-08-06 | End: 2023-08-06

## 2023-08-06 RX ORDER — KETOROLAC TROMETHAMINE 10 MG/1
10 TABLET, FILM COATED ORAL EVERY 6 HOURS PRN
Qty: 20 TABLET | Refills: 0 | Status: SHIPPED | OUTPATIENT
Start: 2023-08-06 | End: 2023-08-11

## 2023-08-06 RX ADMIN — KETOROLAC TROMETHAMINE 15 MG: 15 INJECTION, SOLUTION INTRAMUSCULAR; INTRAVENOUS at 02:56

## 2023-08-06 RX ADMIN — LORAZEPAM 1 MG: 1 TABLET ORAL at 03:20

## 2023-08-06 RX ADMIN — IOPAMIDOL 75 ML: 755 INJECTION, SOLUTION INTRAVENOUS at 03:16

## 2023-08-06 NOTE — ED NOTES
Pt asking if she can go outside to smoke at this time, this RN offered nicotine patch or lozenges. Pt refused, will alert this RN if she would like in the future.       Heriberto Garcia RN  08/06/23 8012
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
## 2023-08-06 NOTE — ED PROVIDER NOTES
the skin daily 12 hours on, 12 hours off. Ok to substitute for 4% if not covered by insurance       Comment: Please note this report has been produced using speech recognition software and may contain errors related to that system including errors in grammar, punctuation, and spelling, as well as words and phrases that may be inappropriate. If there are any questions or concerns please feel free to contact the dictating provider for clarification.        Sobia Harry MD  08/06/23 5960
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
## 2023-08-09 ENCOUNTER — HOSPITAL ENCOUNTER (OUTPATIENT)
Dept: PSYCHIATRY | Age: 38
Setting detail: THERAPIES SERIES
Discharge: HOME | End: 2023-08-09
Payer: MEDICAID

## 2023-08-09 PROCEDURE — 90853 GROUP PSYCHOTHERAPY: CPT
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
## 2023-08-10 ENCOUNTER — HOSPITAL ENCOUNTER (OUTPATIENT)
Dept: PSYCHIATRY | Age: 38
Setting detail: THERAPIES SERIES
Discharge: HOME | End: 2023-08-10
Payer: MEDICAID

## 2023-08-10 PROCEDURE — 90834 PSYTX W PT 45 MINUTES: CPT
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
## 2023-08-14 ENCOUNTER — HOSPITAL ENCOUNTER (OUTPATIENT)
Dept: PSYCHIATRY | Age: 38
Setting detail: THERAPIES SERIES
Discharge: HOME | End: 2023-08-14
Payer: MEDICAID

## 2023-08-14 NOTE — PROGRESS NOTES
500 [facility]        Individual  Progress Note    Location: [x] [place] [] [facility]                   Patient Name: Estephania Altamirano   : 1985     Case # :  [id number]  Therapist: JOSE Avila        Objective/Service/Time: case management    Counselor called client to inquire about insurance change and Vivitrol shot appointment. Client reports Kael Salgado will not go into effect until 2023. She has Naltrexone pills and will take them until she can get the Vivitrol shot at the [facility] with her [employer]. Client reports she will be fine, stating, \"The pills work and I have enough to last until I can get the shot\".          Electronically signed by Leora Garza on 2023 at 10:00 AM

## 2023-08-16 ENCOUNTER — HOSPITAL ENCOUNTER (OUTPATIENT)
Dept: PSYCHIATRY | Age: 38
Setting detail: THERAPIES SERIES
Discharge: HOME | End: 2023-08-16
Payer: MEDICAID

## 2023-08-16 PROCEDURE — 90853 GROUP PSYCHOTHERAPY: CPT
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
## 2023-08-17 ENCOUNTER — HOSPITAL ENCOUNTER (OUTPATIENT)
Dept: PSYCHIATRY | Age: 38
Setting detail: THERAPIES SERIES
Discharge: HOME | End: 2023-08-17
Payer: MEDICAID

## 2023-08-17 PROCEDURE — 80305 DRUG TEST PRSMV DIR OPT OBS: CPT

## 2023-08-17 PROCEDURE — 90834 PSYTX W PT 45 MINUTES: CPT

## 2023-08-17 NOTE — PROGRESS NOTES
500 [facility]        Individual  Progress Note    Location: [x] [place] [] [facility]                   Patient Name: Anthony Angulo   : 1985     Case # :  [id number]  Therapist: JOSE Lomeli        Objective/Service/Time: kept 1 hour individual     Goal 1 Objective 1 achieved    S-Client is a 42year old  female on probation. Client denies any use or cravings. She shared she is still taking Naltrexone daily. She reports she is living at the [facility] and her  is still with her acting like nothing happened. She stated, \"Yesterday after group I got back and we picked up my grandson and took him to ride his dirt bike. He just thinks we are fine. He believes things are fine. I told him we needed to go to counseling or something. He told me to make the call and he would go\". Client and counselor explored negative consequences of her AoD use in her life. She stated, \"The biggest consequence is my kid not talking to me. She just started to talk to me a couple weeks ago. I lost trust from my whole family. I never had any money. I sold any and everything I had to get drugs. I spent 13 months locked up. I have Hep C and my teeth are bad\". Client shared she had been to NA and AA meetings in the past but reports she was triggered by NA meetings. Client shared she needs mental health medication refills and was referred to and signed an RUBEN for [address]. Client also received information for obtaining her SS card. O-Client was cooperative, pleasant and oriented x 4. A-Client has good insight into her AoD use and consequences. She is taking medication orally until her insurance gets changed to [employer] and she can receive a Vivitrol shot. Client has not engaged in 12 step meetings yet but has been encouraged to do so and build more sober support.  Client was given pamphlets for [facility] program for men and women of
challenge, and replace destructive, high risk self-talk with positive strength building self-talk in 90 days. Evaluation Date: 11/10/2023 Code: C Continue TBD      3)  Utilize, if needed case management services provided by [facility] to enhance abstaining from substance use Evaluation Date: 11/10/2023 Code: C Continue TBD         3. Problem: History of Relapse resulting in emotional upset with self and family relationships. Goal: Client will identify and address the core dynamics and dilemmas that are perpetuating consequences and exacerbate relapses and triggers and in 90 days      b. Objectives:   1)  Client will identify 3-5 sober support person to contact weekly to share her thoughts and feelings to avoid relapse in 90 days Evaluation Date: 11/10/2023 Code: C Continue TBD     2) Client will enhance 4 to 8 healthy techniques and coping skills, relapse prevention in 90 days Evaluation Date: 11/10/2023 Code: C Continue TBD    3) Client will attend 1-2 AA/NA meetings Biweekly in 90 days. Evaluation Date: 11/10/2023 Code: C Continue TBD    Defer: Client would like to get her own place to live. Discharge Plan/Instructions: Client will remain sober, continue receiving Vivitrol shot and complete treatment plan goals and objectives. Sandoval Ponce 1985 has participated in the treatment plan development outlined above on 8/17/2023.      Art Mccullough LCDCIII  8/17/2023/2:05 PM

## 2023-08-23 ENCOUNTER — HOSPITAL ENCOUNTER (OUTPATIENT)
Dept: PSYCHIATRY | Age: 38
Setting detail: THERAPIES SERIES
Discharge: HOME | End: 2023-08-23
Payer: MEDICAID

## 2023-08-23 PROCEDURE — 90853 GROUP PSYCHOTHERAPY: CPT

## 2023-08-23 NOTE — GROUP NOTE
500 [facility] Therapy Note      8/23/2023    Location:  [place]      Clients Presents: 1647, 2123, [address] [facility]    Clients Absent: 1480, 1339, 1500    Length of session: 1.5 hours    Group Note: OP    Group Type: Women's    New members were welcomed and introduced. Norms and expectations of group were discussed. Content: Counselor facilitated client check in information focusing on life's stressors and coping skills to avoid relapse. Belle Ayers, [address]  8/23/2023 5:30 PM    Co-Therapist: N/A      [facility] Individual Group Progress Note    Gabby Cruz  1985 8/23/2023    Notes on Client Progress in Group    Client reports she is making progress on her goals in treatment. She denies any use or cravings. She reports she is still taking Naltrexone daily. Client participated in group discussion and shared she is stressed trying to find either a place to rent or a place to park her RV. She is not having any luck and her PO said she needs to be out of the [facility] by September 15th. She is trying to stay in the day and do what she can to call Landlords and keep looking.      Jeet [address]  8/23/2023 5:30 PM    Co-Therapist: N/A

## 2023-08-24 ENCOUNTER — HOSPITAL ENCOUNTER (OUTPATIENT)
Dept: PSYCHIATRY | Age: 38
Setting detail: THERAPIES SERIES
Discharge: HOME | End: 2023-08-24
Payer: MEDICAID

## 2023-08-24 PROCEDURE — 90834 PSYTX W PT 45 MINUTES: CPT

## 2023-08-24 NOTE — PROGRESS NOTES
[address] [facility] [address] TREATMENT PLAN      Location: [x] [place] [] [facility]    Treatment plan: Initial    Strengths: smart, funny    Weakness/Limitations: isolating self     Service/Frequency/Duration: Individual 1 a week for 90 days, Group assigned 1 a week for 90 days, Urinalysis Random for 90 days, AA/NA 1-2 biweekly for 90 days, and Case Management as needed for 90 days    Diagnosis: F11.20 Opioid dependence-unspecified use F15.20 Amphetamine and other stimulant dependence-unspecified use    Level of Care: 1 Outpatient Services      Problem: History of Substance use resulting robbery charges in 2020 and now being on community control after prison release. Goal: Client will enhance personalized knowledge and insight associated with mood altering substances in 90 days   Objectives:   1) Client will remind herself of detrimental consequences in major life areas regarding AoD use in 90 days Evaluation Date: 11/10/2023 Code: Achieved 8/17/2023 Client reports her relationships were affected, her children wouldn't talk to her or trust her, she was always broke and sold everything due to drugs, I spent 13 months locked up, my health was affected I have Hep C and I am homeless. 2) Client will identify 4 to 8 benefits and gratitude's due to remaining substance free in 90 days: Evaluation Date: 11/10/2023 Code: C Continue TBD     3) Client will list instances when addiction has led to relationship conflicts and have led to addictive behavior in 90 days and Evaluation Date: 11/10/2023 Code: Achieved 8/24/2023 Client reports addiction caused lying, cheating, stealing from stores, and her  and her fighting about her use. 2.    Problem: Low Self-Esteem/Identify issues as a result of her self-medicating.      Goal: Client will learn to focus on her character strengths and feel better about herself in 90 days      Objectives:   1) Client will journal 1-2 times weekly thoughts and feelings and share how this aids
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
## 2023-08-30 ENCOUNTER — HOSPITAL ENCOUNTER (OUTPATIENT)
Dept: PSYCHIATRY | Age: 38
Setting detail: THERAPIES SERIES
Discharge: HOME | End: 2023-08-30
Payer: MEDICAID

## 2023-08-30 PROCEDURE — 90853 GROUP PSYCHOTHERAPY: CPT
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
## 2023-08-31 ENCOUNTER — HOSPITAL ENCOUNTER (OUTPATIENT)
Dept: PSYCHIATRY | Age: 38
Setting detail: THERAPIES SERIES
Discharge: HOME | End: 2023-08-31
Payer: MEDICAID

## 2023-08-31 NOTE — PROGRESS NOTES
500 [facility]        Individual  Progress Note    Location: [x] [place] [] [facility]                   Patient Name: Susy Ye   : 1985     Case # :  [id number]  Therapist: JOSE Schaeffer        Objective/Service/Time:     Client did not show for her session. Counselor called client and left a voice message. Counselor remembered yesterday at group client mentioned having to attend a  either Thursday or Friday? I am marking her as a cancellation.          Electronically signed by Linda Mao on 2023 at 2:46 PM

## 2023-09-06 ENCOUNTER — HOSPITAL ENCOUNTER (OUTPATIENT)
Dept: PSYCHIATRY | Age: 38
Setting detail: THERAPIES SERIES
Discharge: HOME | End: 2023-09-06
Payer: MEDICAID

## 2023-09-06 PROCEDURE — 90853 GROUP PSYCHOTHERAPY: CPT

## 2023-09-06 NOTE — GROUP NOTE
500 [facility] Group Therapy Note      9/6/2023    Location:  [place]      Clients Presents: 8363, 1336, 1463, 1495, 1505    Clients Absent: 1480, 6698, 1477    Length of session: 1.5 hours    Group Note: OP    Group Type: Women's    New members were welcomed and introduced. Norms and expectations of group were discussed. Content: Counselor presented a topic focused discussion on living life on life's terms. Client shared a daily struggle and a coping skill to help her move through the struggle. Client offered feedback and support to her peers. Jordon Mckeon  9/6/2023 5:30 PM    Co-Therapist: N/A      [facility] Individual Group Progress Note    Pattie Kee  1985 9/6/2023    Notes on Client Progress in Group    Client shared she is making progress and has found a place to live. She is struggling with her marriage but stated \"it nothing new\". Client had to go to the hospital for her daughter having twins prematurely.      Jordon Mckeon  9/6/2023 5:41 PM    Co-Therapist: N/A

## 2023-09-07 ENCOUNTER — HOSPITAL ENCOUNTER (OUTPATIENT)
Dept: PSYCHIATRY | Age: 38
Setting detail: THERAPIES SERIES
Discharge: HOME | End: 2023-09-07
Payer: MEDICAID

## 2023-09-07 NOTE — PROGRESS NOTES
500 [facility]        Individual  Progress Note    Location: [x] [place] [] [facility]                   Patient Name: Jeb Carreon   : 1985     Case # :  [id number]  Therapist: Carlen Phoenix, LCDCIII        Objective/Service/Time:     Client called to cancel her individual session today. Client reports her pregnant daughter has gone into  labor and [facility] is transferring her to [facility]. Her daughter is pregnant with twins and she is only 27 weeks pregnant. Client reports she is willing to come in tomorrow if there is an opening. At this time schedule is full.          Electronically signed by Ev Ochoa on 2023 at 1:44 PM

## 2023-09-13 ENCOUNTER — HOSPITAL ENCOUNTER (OUTPATIENT)
Dept: PSYCHIATRY | Age: 38
Setting detail: THERAPIES SERIES
Discharge: HOME | End: 2023-09-13
Payer: COMMERCIAL

## 2023-09-13 PROCEDURE — 90853 GROUP PSYCHOTHERAPY: CPT

## 2023-09-13 NOTE — GROUP NOTE
500 [facility] Group Therapy Note      9/13/2023    Location:  [place]      Clients Presents: 9921, 1500, 1390, 1339, 9897, 1042, 1477, 1463, 1495    Clients Absent: 1480, 1476    Length of session: 1.5 hours    Group Note: OP    Group Type: Women's    New members were welcomed and introduced. Norms and expectations of group were discussed. Content: Counselor facilitated a group discussion on \"asking for help\". Client identified the importance of asking for help and crushing the ego. Client identified people she has for support. Client offered peers feedback and support. Jeet Valero  9/13/2023 5:33 PM    Co-Therapist: N/A      [facility] Individual Group Progress Note    Enrique Blair  1985 9/13/2023    Notes on Client Progress in Group    Client shared she is remaining sober and is almost out of her naltrexone pills and needs the Vivitrol shot. Client reports her  was arrested on a warrant and is now in long term. She is not upset about it at all. Client participated in group discussion on asking for help.      Jeet Valero [address]  9/13/2023 5:39 PM    Co-Therapist: N/A

## 2023-09-14 ENCOUNTER — HOSPITAL ENCOUNTER (OUTPATIENT)
Dept: PSYCHIATRY | Age: 38
Setting detail: THERAPIES SERIES
Discharge: HOME | End: 2023-09-14
Payer: COMMERCIAL

## 2023-09-14 DIAGNOSIS — F11.20 OPIOID TYPE DEPENDENCE, CONTINUOUS (HCC): ICD-10-CM

## 2023-09-14 DIAGNOSIS — F15.20 AMPHETAMINE DEPENDENCE (HCC): ICD-10-CM

## 2023-09-14 PROCEDURE — 90834 PSYTX W PT 45 MINUTES: CPT

## 2023-09-14 PROCEDURE — 80305 DRUG TEST PRSMV DIR OPT OBS: CPT

## 2023-09-14 NOTE — PROGRESS NOTES
500 [facility]        Individual  Progress Note    Location: [x] [place] [] [facility]                   Patient Name: Palma Yoo   : 1985     Case # :  [id number]  Therapist: JOSE Pacheco        Objective/Service/Time: kept 1 hour individual     Goal 2 Objective 2 continue    S-Client is a 42year old  female on probation. Client denies any use or cravings. She stated, \"I only have a couple pills left and I really need to get the shot. I don't have the physical card yet. I switched to [employer] but I haven't gone over to my father in laws home to check the mail\". Client shared she has been a little anxious since her  got arrested on a warrant last weekend. She stated, \"It is weird. I am not sad. I am actually happy. I can focus on me now. I am thinking about getting a job and doing all this on my own and getting out from under his control\". Client shared how she has never been her \"own person\". Client shared she is trying to work on her thoughts stating, \"I doubt myself all the time. I think it comes from years of being told you can't do this or that. Being told no one will ever want you or you are not smart enough. I am now saying I can do this by myself! I am smart! I am safe! I don't need anyone else\". O-Client was cooperative, pleasant and oriented x 4. She shared her thoughts and feelings openly. A-Client has good insight into her AoD use and consequences. She was due for her Vivitrol shot 4 weeks ago and due to insurance was not able to get it. She now has correct insurance but has not got the physical card in the mail.  is working with client to get shot ordered. Counselor encouraged client to follow through with getting a job, opening up own bank account and building support and self esteem. P-Continue services.          Electronically signed by Precious Cole on 2023 at 2:48 PM
500 [facility]        Individual  Progress Note    Location: [x] [place] [] [facility]                   Patient Name: Renata Maurice   : 1985     Case # :  [id number]  Therapist: JOSE Mack        Objective/Service/Time:   CASE MANAGEMENT  . 50  Problem 2 Objective 3    S:  I met with client about Vivitrol Order. Client does not have her actual [employer] card in hand. However, I contacted Silver Duarte at [address], and she verified her [employer] in [place]. I faxed Vivitrol order to Infusion. Reportedly [facility] will contact client to schedule. If they do not have injection on hand, it will take a few days to arrive after ordering. O:  Client was oriented and open to discussion. A:  Client 's information was faxed to infusion center. Client stated \"I am getting anxious, and hope I am able to get the shot soon. \"      P:  I will follow up with client and [facility] [address]. Client will continue to speak with Therapist or  about requests for assistance if needed.       Lizzy Scherer, JESSICA, [facility], CTTS       Electronically signed by Joe Art on 2023 at 3:21 PM
recovery in her individual sessions in 90 days: Evaluation Date: 11/10/2023 Code: C Continue TBD      2) Client will identify, challenge, and replace destructive, high risk self-talk with positive strength building self-talk in 90 days. Evaluation Date: 11/10/2023 Code: Continue 9/14/2023 Client shared she doubts herself and is now saying \"I can do this by myself! I am smart! I am safe! I don't need anyone else. 3)  Utilize, if needed case management services provided by [facility] to enhance abstaining from substance use Evaluation Date: 11/10/2023 Code: C Continue TBD         3. Problem: History of Relapse resulting in emotional upset with self and family relationships. Goal: Client will identify and address the core dynamics and dilemmas that are perpetuating consequences and exacerbate relapses and triggers and in 90 days      b. Objectives:   1)  Client will identify 3-5 sober support person to contact weekly to share her thoughts and feelings to avoid relapse in 90 days Evaluation Date: 11/10/2023 Code: C Continue TBD     2) Client will enhance 4 to 8 healthy techniques and coping skills, relapse prevention in 90 days Evaluation Date: 11/10/2023 Code: C Continue TBD    3) Client will attend 1-2 AA/NA meetings Biweekly in 90 days. Evaluation Date: 11/10/2023 Code: C Continue TBD    Defer: Client would like to get her own place to live. Discharge Plan/Instructions: Client will remain sober, continue receiving Vivitrol shot and complete treatment plan goals and objectives. Ita Garcia / 1985 has participated in the treatment plan development outlined above on 9/14/2023.      Carol Gonzalez LCDCIII  9/14/2023/2:21 PM

## 2023-09-15 ENCOUNTER — HOSPITAL ENCOUNTER (OUTPATIENT)
Dept: INFUSION THERAPY | Age: 38
Setting detail: INFUSION SERIES
Discharge: HOME | End: 2023-09-15
Payer: COMMERCIAL

## 2023-09-15 ENCOUNTER — TELEPHONE (OUTPATIENT)
Dept: INFUSION THERAPY | Age: 38
End: 2023-09-15

## 2023-09-15 VITALS
HEART RATE: 81 BPM | SYSTOLIC BLOOD PRESSURE: 114 MMHG | OXYGEN SATURATION: 98 % | TEMPERATURE: 98.2 F | DIASTOLIC BLOOD PRESSURE: 70 MMHG | RESPIRATION RATE: 16 BRPM

## 2023-09-15 DIAGNOSIS — F11.20 OPIOID TYPE DEPENDENCE, CONTINUOUS (HCC): Primary | ICD-10-CM

## 2023-09-15 DIAGNOSIS — F15.20 AMPHETAMINE DEPENDENCE (HCC): ICD-10-CM

## 2023-09-15 PROCEDURE — 96372 THER/PROPH/DIAG INJ SC/IM: CPT

## 2023-09-15 PROCEDURE — 6360000002 HC RX W HCPCS: Performed by: PHYSICAL MEDICINE & REHABILITATION

## 2023-09-15 RX ORDER — DULOXETIN HYDROCHLORIDE 30 MG/1
90 CAPSULE, DELAYED RELEASE ORAL DAILY
COMMUNITY

## 2023-09-15 RX ADMIN — NALTREXONE 380 MG: KIT at 11:42

## 2023-09-15 NOTE — DISCHARGE INSTRUCTIONS
Instructions  Continue home meds, diet and activity  Call your Doctor for any specific questions or problems  If you have any problems and are unable to contact your doctor, go to the Emergency Room. Thank you for choosing [facility]. It is a pleasure to serve you.         Infusion Unit  [phone]  8AM-5PM

## 2023-09-15 NOTE — PROGRESS NOTES
Tolerated injection well. Reviewed discharge instruction, voiced understanding. Copies of AVS given. Pt discharged home. Pt to exit via steady gait. Orders Placed This Encounter   Medications    naltrexone (VIVITROL) injection 380 mg     Order Specific Question:   Is naltrexone being ordered for alcohol dependence? Answer:   No     Order Specific Question:   Has patient been opioid-free (including tramadol) for at least 10 days as determined by urinalysis? Answer:   Yes     Order Specific Question:   Has the patient been given/passed a naloxone or oral naltrexone challenge test?     Answer:    Yes

## 2023-09-20 ENCOUNTER — HOSPITAL ENCOUNTER (OUTPATIENT)
Dept: PSYCHIATRY | Age: 38
Setting detail: THERAPIES SERIES
Discharge: HOME | End: 2023-09-20
Payer: COMMERCIAL

## 2023-09-20 PROCEDURE — 90853 GROUP PSYCHOTHERAPY: CPT

## 2023-09-20 NOTE — GROUP NOTE
500 [facility] Group Therapy Note      9/20/2023    Location:  [place]      Clients Presents: 0195, 6569, 1500, 1477, 1390, 1339, 9897, 1463, 1495    Clients Absent: 1480    Length of session: 1.5 hours    Group Note: OP    Group Type: Women's    New members were welcomed and introduced. Norms and expectations of group were discussed. Content: Counselor facilitated client check in information with focus on emotional sobriety. Jeet Lomeli  9/20/2023 5:31 PM    Co-Therapist: N/A      [facility] Individual Group Progress Note    Anthony Angulo  1985 9/20/2023    Notes on Client Progress in Group    Client shared she is making progress on her treatment goals and staying sober. Client reports she is happy and is loving her new life. Client participated in group discussion on emotional sobriety. She reports today has been a good day.         Jeet Lomeli W Leyda Rosario  9/20/2023 5:38 PM    Co-Therapist: N/A

## 2023-09-21 ENCOUNTER — HOSPITAL ENCOUNTER (OUTPATIENT)
Dept: PSYCHIATRY | Age: 38
Setting detail: THERAPIES SERIES
Discharge: HOME | End: 2023-09-21
Payer: COMMERCIAL

## 2023-09-27 ENCOUNTER — HOSPITAL ENCOUNTER (OUTPATIENT)
Dept: PSYCHIATRY | Age: 38
Setting detail: THERAPIES SERIES
Discharge: HOME | End: 2023-09-27
Payer: COMMERCIAL

## 2023-09-27 PROCEDURE — 90853 GROUP PSYCHOTHERAPY: CPT

## 2023-09-27 NOTE — GROUP NOTE
[facility] Group Therapy Note      9/27/2023    Location:  [place]      Clients Presents: 6225, 1480, 1507, 1500, 1390, 1339, 1463, 1495    Clients Absent: 1512, 2497, 1476    Length of session: 1.5 hours    Group Note: OP    Group Type: Women's    New members were welcomed and introduced. Norms and expectations of group were discussed. Content: Counselor facilitated a topic focused discussion on obey in recovery. Lino Richardson, Jeet W Leyda Rosario  9/27/2023 5:34 PM    Co-Therapist: N/A      [facility] Individual Group Progress Note    Khushboobenjamin Ruffin  1985 9/27/2023    Notes on Client Progress in Group    Client shared she is making progress on her goals in treatment and shared she is staying sober. She shared she needs a bed and is not sleeping well. Client participated in group discussion on obey in recovery sharing she believes in God and that is the only reason she is sober.      Ant Paez0 W Leyda Rosario  9/27/2023 5:41 PM    Co-Therapist: N/A

## 2023-09-28 ENCOUNTER — HOSPITAL ENCOUNTER (OUTPATIENT)
Dept: PSYCHIATRY | Age: 38
Setting detail: THERAPIES SERIES
Discharge: HOME | End: 2023-09-28
Payer: COMMERCIAL

## 2023-09-28 PROCEDURE — 90834 PSYTX W PT 45 MINUTES: CPT

## 2023-09-28 PROCEDURE — 80305 DRUG TEST PRSMV DIR OPT OBS: CPT

## 2023-09-28 NOTE — PROGRESS NOTES
[address] [facility] [address] TREATMENT PLAN      Location: [x] [place] [] [facility]    Treatment plan: Initial    Strengths: smart, funny    Weakness/Limitations: isolating self     Service/Frequency/Duration: Individual 1 a week for 90 days, Group assigned 1 a week for 90 days, Urinalysis Random for 90 days, AA/NA 1-2 biweekly for 90 days, and Case Management as needed for 90 days    Diagnosis: F11.20 Opioid dependence-unspecified use F15.20 Amphetamine and other stimulant dependence-unspecified use    Level of Care: 1 Outpatient Services      Problem: History of Substance use resulting robbery charges in 2020 and now being on community control after prison release. Goal: Client will enhance personalized knowledge and insight associated with mood altering substances in 90 days   Objectives:   1) Client will remind herself of detrimental consequences in major life areas regarding AoD use in 90 days Evaluation Date: 11/10/2023 Code: Achieved 8/17/2023 Client reports her relationships were affected, her children wouldn't talk to her or trust her, she was always broke and sold everything due to drugs, I spent 13 months locked up, my health was affected I have Hep C and I am homeless. 2) Client will identify 4 to 8 benefits and gratitude's due to remaining substance free in 90 days: Evaluation Date: 11/10/2023 Code: C Continue TBD     3) Client will list instances when addiction has led to relationship conflicts and have led to addictive behavior in 90 days and Evaluation Date: 11/10/2023 Code: Achieved 8/24/2023 Client reports addiction caused lying, cheating, stealing from stores, and her  and her fighting about her use. 2.    Problem: Low Self-Esteem/Identify issues as a result of her self-medicating.      Goal: Client will learn to focus on her character strengths and feel better about herself in 90 days      Objectives:   1) Client will journal 1-2 times weekly thoughts and feelings and share how this aids

## 2023-09-28 NOTE — PROGRESS NOTES
500 [facility]        Individual  Progress Note    Location: [x] [place] [] [facility]                   Patient Name: Kade Puentes   : 1985     Case # :  [id number]  Therapist: JOSE Murray        Objective/Service/Time: kept 1 hour individual     Goal 2 Objective 1 continue  Goal 3 Objective 1 continue    S-Client is a 42year old  female on probation. Client denies any use or cravings. She shared her  is being released today from long-term stating, \"I am not getting back with him. He is suppose to  the RV and park it at his cousins home. My kids have told me not to take him back\". Client shared she is suppose to start working on Monday but got a call from the [facility] service stating she may be going to [place] instead. She is not happy. Client stated, \"I want to work at [employer]. I have a ride there. I really don't want to work at [employer]". Client shared she is talking with her sober support weekly and has started to journal to help her process emotions. O-Client was cooperative, pleasant and oriented x 4. A-Client has good insight into her AoD use and consequences. She is in the action stage of change. Client denied any use of cocaine when reviewing UDS from 2023. Obtained another UDS today. Counselor encouraged 12 step meetings to build more support. P-Continue services.          Electronically signed by Jude Bell on 2023 at 2:39 PM Abdomen soft, nontender, nondistended, bowel sounds present in all 4 quadrants.

## 2023-10-04 ENCOUNTER — HOSPITAL ENCOUNTER (OUTPATIENT)
Dept: PSYCHIATRY | Age: 38
Setting detail: THERAPIES SERIES
Discharge: HOME | End: 2023-10-04
Payer: COMMERCIAL

## 2023-10-04 PROCEDURE — 90853 GROUP PSYCHOTHERAPY: CPT

## 2023-10-04 NOTE — GROUP NOTE
500 [facility] Group Therapy Note      10/4/2023    Location:  [place]      Clients Presents: 7268, 1339, [id number], 1390, 9092, 9897, 1495,     Clients Absent: 1500, 1512, 1480    Length of session: 1.5 hours    Group Note: OP    Group Type: Women's    New members were welcomed and introduced. Norms and expectations of group were discussed. Content: Counselor facilitated group discussion on relapse prevention. Client identified triggers and coping skills to avoid relapse. Marlyn Abdi, Ant0 W BzzAgent Marlene  10/4/2023 5:30 PM    Co-Therapist: N/A      [facility] Individual Group Progress Note    Vee Garay  1985  10/4/2023    Notes on Client Progress in Group    Client shared she is making progress and staying sober. She is still on vivitrol and feeling good about herself. Client stated, \"I thought I was depressed but now I know I was unhappy in my marriage. Client participated in group discussion on relapse prevention reporting her biggest trigger is being sick. She is sick now and had to remind herself she is only sick with a cold and not \"dope sick\".      Marlyn Abdi, Ant0 [address]  10/4/2023 5:46 PM    Co-Therapist: N/A

## 2023-10-05 ENCOUNTER — HOSPITAL ENCOUNTER (OUTPATIENT)
Dept: PSYCHIATRY | Age: 38
Setting detail: THERAPIES SERIES
Discharge: HOME | End: 2023-10-05
Payer: COMMERCIAL

## 2023-10-05 PROCEDURE — 90832 PSYTX W PT 30 MINUTES: CPT
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
## 2023-10-05 NOTE — PROGRESS NOTES
[address] [facility] [address] TREATMENT PLAN      Location: [x] [place] [] [facility]    Treatment plan: Initial    Strengths: smart, funny    Weakness/Limitations: isolating self     Service/Frequency/Duration: Individual 1 a week for 90 days, Group assigned 1 a week for 90 days, Urinalysis Random for 90 days, AA/NA 1-2 biweekly for 90 days, and Case Management as needed for 90 days    Diagnosis: F11.20 Opioid dependence-unspecified use F15.20 Amphetamine and other stimulant dependence-unspecified use    Level of Care: 1 Outpatient Services      Problem: History of Substance use resulting robbery charges in 2020 and now being on community control after prison release. Goal: Client will enhance personalized knowledge and insight associated with mood altering substances in 90 days   Objectives:   1) Client will remind herself of detrimental consequences in major life areas regarding AoD use in 90 days Evaluation Date: 11/10/2023 Code: Achieved 8/17/2023 Client reports her relationships were affected, her children wouldn't talk to her or trust her, she was always broke and sold everything due to drugs, I spent 13 months locked up, my health was affected I have Hep C and I am homeless. 2) Client will identify 4 to 8 benefits and gratitude's due to remaining substance free in 90 days: Evaluation Date: 11/10/2023 Code: Continue 10/5/2023 Client woke up sick and had an urge to use. Client was grateful she was not \"dope sick\" and overcame to trigger. 3) Client will list instances when addiction has led to relationship conflicts and have led to addictive behavior in 90 days and Evaluation Date: 11/10/2023 Code: Achieved 8/24/2023 Client reports addiction caused lying, cheating, stealing from stores, and her  and her fighting about her use. 2.    Problem: Low Self-Esteem/Identify issues as a result of her self-medicating.      Goal: Client will learn to focus on her character strengths and feel better about herself in

## 2023-10-11 ENCOUNTER — HOSPITAL ENCOUNTER (OUTPATIENT)
Dept: PSYCHIATRY | Age: 38
Setting detail: THERAPIES SERIES
Discharge: HOME | End: 2023-10-11
Payer: COMMERCIAL

## 2023-10-11 PROCEDURE — 90853 GROUP PSYCHOTHERAPY: CPT
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
## 2023-10-12 ENCOUNTER — HOSPITAL ENCOUNTER (OUTPATIENT)
Dept: PSYCHIATRY | Age: 38
Setting detail: THERAPIES SERIES
Discharge: HOME | End: 2023-10-12
Payer: COMMERCIAL

## 2023-10-12 NOTE — PROGRESS NOTES
[address] [facility] [address] TREATMENT PLAN      Location: [x] [place] [] [facility]    Treatment plan: Initial    Strengths: smart, funny    Weakness/Limitations: isolating self     Service/Frequency/Duration: Individual 1 a week for 90 days, Group assigned 1 a week for 90 days, Urinalysis Random for 90 days, AA/NA 1-2 biweekly for 90 days, and Case Management as needed for 90 days    Diagnosis: F11.20 Opioid dependence-unspecified use F15.20 Amphetamine and other stimulant dependence-unspecified use    Level of Care: 1 Outpatient Services      Problem: History of Substance use resulting robbery charges in 2020 and now being on community control after prison release. Goal: Client will enhance personalized knowledge and insight associated with mood altering substances in 90 days   Objectives:   1) Client will remind herself of detrimental consequences in major life areas regarding AoD use in 90 days Evaluation Date: 11/10/2023 Code: Achieved 8/17/2023 Client reports her relationships were affected, her children wouldn't talk to her or trust her, she was always broke and sold everything due to drugs, I spent 13 months locked up, my health was affected I have Hep C and I am homeless. 2) Client will identify 4 to 8 benefits and gratitude's due to remaining substance free in 90 days: Evaluation Date: 11/10/2023 Code: Continue 10/5/2023 Client woke up sick and had an urge to use. Client was grateful she was not \"dope sick\" and overcame to trigger. 3) Client will list instances when addiction has led to relationship conflicts and have led to addictive behavior in 90 days and Evaluation Date: 11/10/2023 Code: Achieved 8/24/2023 Client reports addiction caused lying, cheating, stealing from stores, and her  and her fighting about her use. 2.    Problem: Low Self-Esteem/Identify issues as a result of her self-medicating.      Goal: Client will learn to focus on her character strengths and feel better about herself in

## 2023-10-12 NOTE — PROGRESS NOTES
500 [facility]        Individual  Progress Note    Location: [x] [place] [] [facility]                   Patient Name: Alma Lawson   : 1985     Case # :  [id number]  Therapist: JOSE Oates        Objective/Service/Time:     Client called and canceled due to taking her niece to court today.          Electronically signed by Elisabet García on 10/12/2023 at 2:19 PM

## 2023-10-13 ENCOUNTER — HOSPITAL ENCOUNTER (OUTPATIENT)
Dept: INFUSION THERAPY | Age: 38
Setting detail: INFUSION SERIES
Discharge: HOME | End: 2023-10-13
Payer: COMMERCIAL

## 2023-10-13 VITALS
DIASTOLIC BLOOD PRESSURE: 72 MMHG | OXYGEN SATURATION: 98 % | HEART RATE: 63 BPM | RESPIRATION RATE: 16 BRPM | SYSTOLIC BLOOD PRESSURE: 199 MMHG | TEMPERATURE: 98 F

## 2023-10-13 DIAGNOSIS — F11.20 OPIOID TYPE DEPENDENCE, CONTINUOUS (HCC): Primary | ICD-10-CM

## 2023-10-13 DIAGNOSIS — F15.20 AMPHETAMINE DEPENDENCE (HCC): ICD-10-CM

## 2023-10-13 PROCEDURE — 96372 THER/PROPH/DIAG INJ SC/IM: CPT

## 2023-10-13 PROCEDURE — 6360000002 HC RX W HCPCS: Performed by: PHYSICAL MEDICINE & REHABILITATION

## 2023-10-13 RX ADMIN — NALTREXONE 380 MG: KIT at 14:34

## 2023-10-18 ENCOUNTER — HOSPITAL ENCOUNTER (OUTPATIENT)
Dept: PSYCHIATRY | Age: 38
Setting detail: THERAPIES SERIES
Discharge: HOME | End: 2023-10-18
Payer: COMMERCIAL

## 2023-10-18 NOTE — GROUP NOTE
500 [facility] Group Therapy Note      10/18/2023    Location:  [place]      Clients Presents: 1896, 9092, 1476, 1339, 9897, 1463    Clients Absent: 1495, 1500, 1507    Length of session: 1.5 hours    Group Note: OP    Group Type: Women's    New members were welcomed and introduced. Norms and expectations of group were discussed. Content: Counselor presented a solution focused discussion on anxiety. Client identified situations that case anxiety and coping skills to manage anxiety.      Jeet Paez W Leyda Rosario  10/18/2023 5:30 PM    Co-Therapist: N/A      [facility] Individual Group Progress Note    Shannon Ruffin  1985  10/18/2023    Notes on Client Progress in Group    Reason for Absence: cancelled taking her daughter to the hospital     Lino Richardson, Ant0 W Leyda Rosario  10/18/2023 5:43 PM    Co-Therapist: N/A

## 2023-10-19 ENCOUNTER — HOSPITAL ENCOUNTER (OUTPATIENT)
Dept: PSYCHIATRY | Age: 38
Setting detail: THERAPIES SERIES
Discharge: HOME | End: 2023-10-19
Payer: COMMERCIAL

## 2023-10-19 PROCEDURE — 90834 PSYTX W PT 45 MINUTES: CPT

## 2023-10-19 NOTE — PROGRESS NOTES
500 [facility]        Individual  Progress Note    Location: [x] [place] [] [facility]                   Patient Name: Estephania Altamirano   : 1985     Case # :  [id number]  Therapist: JOSE Avila        Objective/Service/Time: kept 1 hour individual     Goal 2 Objective 1 continue  Goal 3 Objective 1 continue    S-Client is a 42year old  female on probation. Client denies any use or cravings. She shared her next Vivitrol shot is . She stated, \"I have been doing really good. I have 18 months now. It has been a long time since I have been clean this long and it feels good\". Client shared she is still looking for work. Client reports being frustrated by not being able to see her son for the second week in a row due to [facility]. Client also reports her  has moved to [facility] stating, \"He won't be bothering me anymore I hope\". Client shared she is still talking to her sober support daily and is planning on attending [employer] on [place] morning. O-Client was pleasant, cooperative, and oriented x 4. A-Client has good insight into her AoD use and consequences. She has good support and uses regularly. Client is in the maintenance phase of change. Client has not attended any 12 step meetings yet but shared she plans on [employer] instead. P-Continue services, attend [employer] and find work.          Electronically signed by Leora Garza on 10/19/2023 at 2:44 PM

## 2023-10-19 NOTE — PROGRESS NOTES
[address] [facility] [address] TREATMENT PLAN      [facility]: [facility] [place] [facility]    Treatment plan: Initial    Strengths: smart, funny    Weakness/Limitations: isolating self     Service/Frequency/Duration: Individual 1 a week for 90 days, Group assigned 1 a week for 90 days, Urinalysis Random for 90 days, AA/NA 1-2 biweekly for 90 days, and Case Management as needed for 90 days    Diagnosis: F11.20 Opioid dependence-unspecified use F15.20 Amphetamine and other stimulant dependence-unspecified use    Level of Care: 1 Outpatient Services      Problem: History of Substance use resulting robbery charges in 2020 and now being on community control after prison release. Goal: Client will enhance personalized knowledge and insight associated with mood altering substances in 90 days   Objectives:   1) Client will remind herself of detrimental consequences in major life areas regarding AoD use in 90 days Evaluation Date: 11/10/2023 Code: Achieved 8/17/2023 Client reports her relationships were affected, her children wouldn't talk to her or trust her, she was always broke and sold everything due to drugs, I spent 13 months locked up, my health was affected I have Hep C and I am homeless. 2) Client will identify 4 to 8 benefits and gratitude's due to remaining substance free in 90 days: Evaluation Date: 11/10/2023 Code: Continue 10/5/2023 Client woke up sick and had an urge to use. Client was grateful she was not \"dope sick\" and overcame to trigger. 3) Client will list instances when addiction has led to relationship conflicts and have led to addictive behavior in 90 days and Evaluation Date: 11/10/2023 Code: Achieved 8/24/2023 Client reports addiction caused lying, cheating, stealing from stores, and her  and her fighting about her use. 2.    Problem: Low Self-Esteem/Identify issues as a result of her self-medicating.      Goal: Client will learn to focus on her character strengths and feel better about herself in

## 2023-10-25 ENCOUNTER — HOSPITAL ENCOUNTER (OUTPATIENT)
Dept: PSYCHIATRY | Age: 38
Setting detail: THERAPIES SERIES
Discharge: HOME | End: 2023-10-25
Payer: COMMERCIAL

## 2023-10-25 PROCEDURE — 90853 GROUP PSYCHOTHERAPY: CPT

## 2023-10-25 NOTE — GROUP NOTE
500 [facility] Group Therapy Note      10/25/2023    Location:  [place]      Clients Presents: 9999, [address], 1390, 1476, 1339, 9897, 1360, 1463, 1495    Clients Absent: 1500    Length of session: 1.5 hours    Group Note: OP    Group Type: Women's    New members were welcomed and introduced. Norms and expectations of group were discussed. Content: Counselor presented a topic focused discussion on ACE's. Client took ACE test and got her score. Client identified traumatic childhood events that may still be effecting her physical and mental health today. Alfredito Bronson [address]  10/25/2023 5:30 PM    Co-Therapist: N/A      [facility] Individual Group Progress Note    Aram Stockton  1985  10/25/2023    Notes on Client Progress in Group    Client shared she is making progress on her goals and shared her daughter had the twins last night. She is very happy. Client took the ACE test and got a score 10. Client shared she thought it was normal and she states she is a great mom. Not like her parents.      Alfredito Bronson [address]  10/25/2023 5:40 PM    Co-Therapist: N/A

## 2023-10-26 ENCOUNTER — HOSPITAL ENCOUNTER (OUTPATIENT)
Dept: PSYCHIATRY | Age: 38
Setting detail: THERAPIES SERIES
Discharge: HOME | End: 2023-10-26
Payer: COMMERCIAL

## 2023-10-26 PROCEDURE — 80305 DRUG TEST PRSMV DIR OPT OBS: CPT

## 2023-10-26 PROCEDURE — 90834 PSYTX W PT 45 MINUTES: CPT

## 2023-10-26 NOTE — PROGRESS NOTES
500 [facility]        Individual  Progress Note    Location: [x] [place] [] [place] [facility]                   Patient Name: Johan Weiss   : 1985     Case # :  [id number]  Therapist: JOSE Guerrero        Objective/Service/Time: kept 1 hour individual     Goal 2 Objective 2 Achieved  Goal 3 Objective 1 continue  Goal 3 Objective 2 Achieved    S-Client is a 42year old  female on probation. Client denies any use or cravings. She shared she is very stressed today and stated, 'I took a pregnancy test and it was positive. I am 15 days late. I had my tubes tied when I was 19 and I have had 3 ectopic pregnancies and with the 3rd one they told me that one of the tubes came unclamped\". Client reports  is not an option. [facility]. Client also shared she is talking to her support daily and continuing to say her positive affirmations to help stay focused and build self esteem. Client shared she uses coping skills like spending time with her new grand babies, watching TV, looking for work, and cleaning her home. O-Client was cooperative, anxious AEB self report and oriented x 4. She shared her thoughts and feelings openly. A-Client has good insight into her AoD use and consequences. She is still on the Vivitrol shot monthly. Client is in the action stage of change. Client has good family support but has been encouraged to build more female support. Client has been encouraged to attend 12 step meetings or Protestant.      P-Continue services, build support and journal.         Electronically signed by Jeet Guerrero on 10/26/2023 at 2:40 PM

## 2023-10-26 NOTE — PROGRESS NOTES
[address] [facility] [address] TREATMENT PLAN      Location: [x] [place] [] [facility]    Treatment plan: Initial    Strengths: smart, funny    Weakness/Limitations: isolating self     Service/Frequency/Duration: Individual 1 a week for 90 days, Group assigned 1 a week for 90 days, Urinalysis Random for 90 days, AA/NA 1-2 biweekly for 90 days, and Case Management as needed for 90 days    Diagnosis: F11.20 Opioid dependence-unspecified use F15.20 Amphetamine and other stimulant dependence-unspecified use    Level of Care: 1 Outpatient Services      Problem: History of Substance use resulting robbery charges in 2020 and now being on community control after prison release. Goal: Client will enhance personalized knowledge and insight associated with mood altering substances in 90 days   Objectives:   1) Client will remind herself of detrimental consequences in major life areas regarding AoD use in 90 days Evaluation Date: 11/10/2023 Code: Achieved 8/17/2023 Client reports her relationships were affected, her children wouldn't talk to her or trust her, she was always broke and sold everything due to drugs, I spent 13 months locked up, my health was affected I have Hep C and I am homeless. 2) Client will identify 4 to 8 benefits and gratitude's due to remaining substance free in 90 days: Evaluation Date: 11/10/2023 Code: Continue 10/5/2023 Client woke up sick and had an urge to use. Client was grateful she was not \"dope sick\" and overcame to trigger. 3) Client will list instances when addiction has led to relationship conflicts and have led to addictive behavior in 90 days and Evaluation Date: 11/10/2023 Code: Achieved 8/24/2023 Client reports addiction caused lying, cheating, stealing from stores, and her  and her fighting about her use. 2.    Problem: Low Self-Esteem/Identify issues as a result of her self-medicating.      Goal: Client will learn to focus on her character strengths and feel better about herself in

## 2023-11-01 ENCOUNTER — HOSPITAL ENCOUNTER (OUTPATIENT)
Dept: PSYCHIATRY | Age: 38
Discharge: HOME | End: 2023-11-01

## 2023-11-01 NOTE — GROUP NOTE
500 [facility] Group Therapy Note      11/1/2023    Location:  [place]      Clients Presents: 7768, 9092, 1500, 1390, 1476, 1339, 1260    Clients Absent: 6297, 1463, 1495    Length of session: 1.5 hours    Group Note: OP    Group Type: Women's    New members were welcomed and introduced. Norms and expectations of group were discussed. Content: Counselor facilitated a group discussion on \"what is your recovery built with?\" Client was given a picture of a house. In the structure of the home were questions, she was to fill in with the answers about her recovery.      Carlen Phoenix, [address]  11/1/2023 5:30 PM    Co-Therapist: N/A      [facility] Individual Group Progress Note    Jeb Carreon  1985 11/1/2023    Notes on Client Progress in Group    Reason for Absence: cancel    Carlen Phoenix, [address]  11/1/2023 5:42 PM    Co-Therapist: N/A

## 2023-11-02 ENCOUNTER — HOSPITAL ENCOUNTER (OUTPATIENT)
Dept: PSYCHIATRY | Age: 38
Setting detail: THERAPIES SERIES
Discharge: HOME | End: 2023-11-02

## 2023-11-02 NOTE — PROGRESS NOTES
500 [facility]        Individual  Progress Note    Location: [x] [place] [] [place]                   Patient Name: Ke Jimenez   : 1985     Case # :  [id number]  Therapist: JOSE Fortune        Objective/Service/Time:     Client cancelled due to being out of town        Electronically signed by Tiffanie Cruz, [address] on 2023 at 7:50 AM

## 2023-11-08 ENCOUNTER — HOSPITAL ENCOUNTER (OUTPATIENT)
Dept: PSYCHIATRY | Age: 38
Discharge: HOME | End: 2023-11-08

## 2023-11-08 NOTE — GROUP NOTE
500 [facility] Group Therapy Note      11/8/2023    Location:  [place]    Clients Presents: 0204, 6575, 5251, 1500, 1390, 1476, 1339, 9897, 1260    Clients Absent: 1495    Length of session: 1.5 hours    Group Note: OP    Group Type: Women's    New members were welcomed and introduced. Norms and expectations of group were discussed. Content: Counselor presented a topic focused discussion on the stages of change. Client identified which stage of change she was in when she arrived into treatment and what stage she is in now.      Jeet Mckeon [address]  11/8/2023 5:30 PM    Co-Therapist: N/A      [facility] Individual Group Progress Note    Pattie Kee  1985 11/8/2023    Notes on Client Progress in Group    Reason for Absence: DNS     Jeet Mckeon [address]  11/8/2023 5:38 PM    Co-Therapist: N/A

## 2023-11-09 ENCOUNTER — HOSPITAL ENCOUNTER (OUTPATIENT)
Dept: PSYCHIATRY | Age: 38
Setting detail: THERAPIES SERIES
Discharge: HOME | End: 2023-11-09

## 2023-11-09 NOTE — PROGRESS NOTES
500 [facility]        Individual  Progress Note    Location: [x] [place] [facility]                   Patient Name: Susy Ye   : 1985     Case # :  [id number]  Therapist: LEI SchaefferIII        Objective/Service/Time:     Client did not show for her individual session. Counselor called this client's PO Kalina Potrillo and left a voice message. Jeffrey Common called back and informed me and that he saw this client in his office on 11/3/2023 and she tested positive for Cocaine for him. He reports she should not have missed any sessions this week. He is planning on calling her today and telling her to show up at [facility] tomorrow 11/10/2023 between 8-Noon. I will do a UDS since Jeffrey Stockton is not in the office tomorrow.          Electronically signed by Jeet Schaeffer on 2023 at 2:10 PM

## 2023-11-10 ENCOUNTER — TELEPHONE (OUTPATIENT)
Dept: INFUSION THERAPY | Age: 38
End: 2023-11-10

## 2023-11-10 NOTE — TELEPHONE ENCOUNTER
PT NO SHOWED FOR [employer] APPT. PT'S PHONE NO LONGER IN SERVICE. L/M FOR ANNE @ [employer].

## 2023-11-13 ENCOUNTER — HOSPITAL ENCOUNTER (OUTPATIENT)
Dept: PSYCHIATRY | Age: 38
Discharge: HOME | End: 2023-11-13

## 2023-11-13 NOTE — PROGRESS NOTES
500 [facility]        Individual  Progress Note    [facility] [place] [facility]                   Patient Name: Marlene Shetty   : 1985     Case # :  [id number]  Therapist: Tretha Councilman, LCDCIII        Objective/Service/Time: case management    Counselor reciieved a message from the infusion center saying that this client missed her Vivitrol shot of Friday. I called this client and got the recording saying her phone is not in service. I called her PO Xu Bennett and he said he could not reach her either. She is not due in his office until next week. She is due at [facility] on 11/15 for group.          Electronically signed by Caren Galvan on 2023 at 10:41 AM

## 2023-11-15 ENCOUNTER — HOSPITAL ENCOUNTER (OUTPATIENT)
Dept: PSYCHIATRY | Age: 38
Setting detail: THERAPIES SERIES
Discharge: HOME | End: 2023-11-15
Payer: COMMERCIAL

## 2023-11-15 NOTE — GROUP NOTE
500 [facility] Group Therapy Note      11/15/2023    Location:  [place]      Clients Presents: [address], [address] [facility], 1390, 1476, 1339, 9897, 1500, 1260    Clients Absent: 1495, 1516, 1507    Length of session: 1.5 hours    Group Note: OP    Group Type: Women's    New members were welcomed and introduced. Norms and expectations of group were discussed. Content: Counselor presented a solution focused discussion on coping skills to overcome cravings.      Jeet Morejon [address]  11/15/2023 5:34 PM    Co-Therapist: N/A      [facility] Individual Group Progress Note    Jhoan Bhandari  1985  11/15/2023    Notes on Client Progress in Group    Reason for Absence: DNS     Jeet More[address]  11/15/2023 5:43 PM    Co-Therapist: N/A

## 2023-11-16 ENCOUNTER — HOSPITAL ENCOUNTER (OUTPATIENT)
Dept: PSYCHIATRY | Age: 38
Setting detail: THERAPIES SERIES
Discharge: HOME | End: 2023-11-16
Payer: COMMERCIAL

## 2023-11-16 PROCEDURE — 90832 PSYTX W PT 30 MINUTES: CPT
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
## 2023-11-21 ENCOUNTER — HOSPITAL ENCOUNTER (OUTPATIENT)
Dept: INFUSION THERAPY | Age: 38
Setting detail: INFUSION SERIES
Discharge: HOME | End: 2023-11-21
Payer: COMMERCIAL

## 2023-11-21 VITALS
TEMPERATURE: 98 F | DIASTOLIC BLOOD PRESSURE: 74 MMHG | HEART RATE: 73 BPM | RESPIRATION RATE: 16 BRPM | OXYGEN SATURATION: 98 % | SYSTOLIC BLOOD PRESSURE: 110 MMHG

## 2023-11-21 DIAGNOSIS — F11.20 OPIOID TYPE DEPENDENCE, CONTINUOUS (HCC): Primary | ICD-10-CM

## 2023-11-21 DIAGNOSIS — F15.20 AMPHETAMINE DEPENDENCE (HCC): ICD-10-CM

## 2023-11-21 PROCEDURE — 6360000002 HC RX W HCPCS: Performed by: PHYSICAL MEDICINE & REHABILITATION

## 2023-11-21 PROCEDURE — 96372 THER/PROPH/DIAG INJ SC/IM: CPT

## 2023-11-21 RX ADMIN — NALTREXONE 380 MG: KIT at 10:32

## 2023-11-22 ENCOUNTER — HOSPITAL ENCOUNTER (OUTPATIENT)
Dept: PSYCHIATRY | Age: 38
Setting detail: THERAPIES SERIES
Discharge: HOME | End: 2023-11-22
Payer: COMMERCIAL

## 2023-11-22 PROCEDURE — 80305 DRUG TEST PRSMV DIR OPT OBS: CPT

## 2023-11-22 PROCEDURE — 90853 GROUP PSYCHOTHERAPY: CPT
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
## 2023-11-29 ENCOUNTER — HOSPITAL ENCOUNTER (OUTPATIENT)
Dept: PSYCHIATRY | Age: 38
Setting detail: THERAPIES SERIES
Discharge: HOME | End: 2023-11-29
Payer: COMMERCIAL

## 2023-11-29 PROCEDURE — 90853 GROUP PSYCHOTHERAPY: CPT
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
## 2023-11-30 ENCOUNTER — HOSPITAL ENCOUNTER (OUTPATIENT)
Dept: PSYCHIATRY | Age: 38
Setting detail: THERAPIES SERIES
Discharge: HOME | End: 2023-11-30
Payer: COMMERCIAL

## 2023-11-30 PROCEDURE — 90834 PSYTX W PT 45 MINUTES: CPT

## 2023-11-30 NOTE — PROGRESS NOTES
[facility]        Individual  Progress Note    Location: [x] [place] [] [place]                   Patient Name: Arnold Beasley   : 1985     Case # :  [id number]  Therapist: LEI DuranIII        Objective/Service/Time: kept 1 hour individual     Goal 2 Objective 2 continue    S-Client is a 34year old  female on probation. Client denies any use or cravings. She shared she had to walk here today and stated, \"I just came from downtown. I had to meet the lady from [employer] down there to file for the emergency TPO. I got to testify on  and I will get the permanent TPO. He has several warrants out for him. I know Rajan Friassteve is looking for him too. I am so scared he will find me before they find him\". Client shared she is staying with a cousin but he doesn't know where this cousin lives. She reports [employer] is helping her with housing. O-Client was cooperative, anxious and fearful AEB self report and oriented x 4. She shared her thoughts and feelings openly. A-Client has good insight into her AoD use and consequences. She has small sober support network. Client is looking for a job. She is in contact with detectives,  and [employer] for support. Client got her Vivtrol shot last week. If she keeps progressing Client will be completing treatment 2023. P-Continue services.          Electronically signed by Jeet Duran on 2023 at 3:04 PM

## 2023-11-30 NOTE — PROGRESS NOTES
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 Goal: Client will enhance personalized knowledge and insight associated with mood altering substances in 90 days   Objectives:   1) Client will remind herself of detrimental consequences in major life areas regarding AoD use in 90 days Evaluation Date: 11/10/2023 Code: Achieved 8/17/2023 Client reports her relationships were affected, her children wouldn't talk to her or trust her, she was always broke and sold everything due to drugs, I spent 13 months locked up, my health was affected I have Hep C and I am homeless. 2) Client will identify 4 to 8 benefits and gratitude's due to remaining substance free in 90 days: Evaluation Date: 11/10/2023 Code: Continue 10/5/2023 Client woke up sick and had an urge to use. Client was grateful she was not \"dope sick\" and overcame to trigger. 3) Client will list instances when addiction has led to relationship conflicts and have led to addictive behavior in 90 days and Evaluation Date: 11/10/2023 Code: Achieved 8/24/2023 Client reports addiction caused lying, cheating, stealing from stores, and her  and her fighting about her use. 2.    Problem: Low Self-Esteem/Identify issues as a result of her self-medicating.      Goal: Client will learn to focus on her character strengths and feel better about herself in

## 2023-12-06 ENCOUNTER — HOSPITAL ENCOUNTER (OUTPATIENT)
Dept: PSYCHIATRY | Age: 38
Setting detail: THERAPIES SERIES
Discharge: HOME | End: 2023-12-06
Payer: COMMERCIAL

## 2023-12-06 PROCEDURE — 90853 GROUP PSYCHOTHERAPY: CPT
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
## 2023-12-07 ENCOUNTER — HOSPITAL ENCOUNTER (OUTPATIENT)
Dept: PSYCHIATRY | Age: 38
Setting detail: THERAPIES SERIES
Discharge: HOME | End: 2023-12-07
Payer: COMMERCIAL

## 2023-12-07 PROCEDURE — 90834 PSYTX W PT 45 MINUTES: CPT

## 2023-12-07 PROCEDURE — 80305 DRUG TEST PRSMV DIR OPT OBS: CPT

## 2023-12-07 NOTE — PROGRESS NOTES
500 [facility]        Individual  Progress Note    Location: [x] [place] [] [facility]                   Patient Name: Drew Adams   : 1985     Case # :  [id number]  Therapist: Nicolas Conception, LCDCIII        Objective/Service/Time: kept 1 hour individual     Goal 1 Objective 2 achieved  Goal 2 Objective 1 continue    S-Client is a 34year old  female on probation. Client denies any use or cravings. She reports she gets her next Vivitrol shot . Client stated, \"I really need to call [facility] and get back on my Cymbalta. With everything I have going on right now I probably should be taking it now more than ever\". Client reports she has not been taking it for 3 months now. Client shared she is waiting for [employer] to call her back so she can get more information on housing and assistance with her divorce. Client shared she is grateful her grand babies got discharged from the hospital today and are home doing better. She shared she is still looking for a job and reports she is overwhelmed and frustrated at how long it is taking stating, \"I have never had an issue getting a job before\". Client is using positive self talk. O-Client was cooperative, pleasant and oriented x 4. A-Client has good insight into her AoD use and consequences. She is in the action stage of change. Client is engaged in 12 step meetings and Anabaptism. Client is looking for work and is engaged with [employer] woman for support with TPO, divorce and housing assistance.      P-Continue services with expected graduation 2023      Electronically signed by Galen Lesch on 2023 at 2:48 PM

## 2023-12-11 ENCOUNTER — HOSPITAL ENCOUNTER (OUTPATIENT)
Dept: PSYCHIATRY | Age: 38
Discharge: HOME | End: 2023-12-11
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
## 2023-12-13 ENCOUNTER — HOSPITAL ENCOUNTER (OUTPATIENT)
Dept: PSYCHIATRY | Age: 38
Setting detail: THERAPIES SERIES
Discharge: HOME | End: 2023-12-13
Payer: COMMERCIAL

## 2023-12-13 PROCEDURE — 90853 GROUP PSYCHOTHERAPY: CPT

## 2023-12-13 NOTE — GROUP NOTE
500 [facility] Group Therapy Note      12/13/2023    Location:  [place]      Clients Presents: [id number], 0682, 1543, 1495, 9897, [id number]    Clients Absent: [id number]    Length of session: 1.5 hours    Group Note: OP    Group Type: Women's    New members were welcomed and introduced. Norms and expectations of group were discussed. Content: Counselor presented a sober support bonding exercise. Client answered questions about themselves to get better acquainted with group members. Jeet Cordova W [employer] Marlene  12/13/2023 5:30 PM    Co-Therapist: N/A      [facility] Individual Group Progress Note    Boyd Krabbe  1985 12/13/2023    Notes on Client Progress in Group    Client shared she is making progress on her goals and reports this is her last group. Client shared she is staying at [employer] and is not happy but is glad she will have her own place soon. Client participated in filling out the \"Getting to know one another\" worksheet and shared with group members.       Jeet Cordova W [employer] Marlene  12/13/2023 5:38 PM    Co-Therapist: N/A

## 2023-12-14 ENCOUNTER — HOSPITAL ENCOUNTER (OUTPATIENT)
Dept: PSYCHIATRY | Age: 38
Setting detail: THERAPIES SERIES
Discharge: HOME | End: 2023-12-14
Payer: COMMERCIAL

## 2023-12-14 PROCEDURE — 90834 PSYTX W PT 45 MINUTES: CPT

## 2023-12-14 NOTE — PROGRESS NOTES
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 Goal: Client will enhance personalized knowledge and insight associated with mood altering substances in 90 days   Objectives:   1) Client will remind herself of detrimental consequences in major life areas regarding AoD use in 90 days Evaluation Date: 11/10/2023 Code: Achieved 8/17/2023 Client reports her relationships were affected, her children wouldn't talk to her or trust her, she was always broke and sold everything due to drugs, I spent 13 months locked up, my health was affected I have Hep C and I am homeless. 2) Client will identify 4 to 8 benefits and gratitude's due to remaining substance free in 90 days: Evaluation Date: 11/10/2023 Code: Achieved 12/7/2023 Client reports she is grateful her grand babies are getting out of the hospital today. 3) Client will list instances when addiction has led to relationship conflicts and have led to addictive behavior in 90 days and Evaluation Date: 11/10/2023 Code: Achieved 8/24/2023 Client reports addiction caused lying, cheating, stealing from stores, and her  and her fighting about her use. 2.    Problem: Low Self-Esteem/Identify issues as a result of her self-medicating.      Goal: Client will learn to focus on her character strengths and feel better about herself in 90 days      Objectives:

## 2023-12-14 NOTE — PROGRESS NOTES
500 [facility] Discharge Treatment Plan    Kinga Del Valle  1985  Case # [id number]    Location: [x] [place] [] [place]    A. Stresses that I need to monitor  1.    2. Getting Mental Health meds/Vivitrol shot  3. Food stamps   4. Job    B. Major triggers to using alcohol/drugs   1. Overwhelmed    2.    3.      Sober Plan   1. Continue with Vivitrol  2. Talking to sober support   3. Continue meetings      C. Sobriety Support   1. [facility]     2. Treatment staff:  [facility] staff   3. Family member: Varsha Baron   4. AA/NA recovery program, sponsor, meetings day/times, daily ready: online meetings     D. Non-using activities  1. [employer] walks  2. Listen to music   3. Skating     E. Consequences of Drug/Alcohol use  1. FDC   2. Out of family's life   3. Loss of home     G. Short term goals to achieve  1. Client would like to find a job   2. Client would like to have her own place     H. Follow up recommendations  1. Client is encouraged to continue to reach out to her sober support. 2. Client is encouraged to continue receiving her Vivitrol shot. Kinga Del Valle / 1985 has participated in the discharge treatment plan development outlined above on 12/14/2023.      JOSE Gómez  12/14/2023/1:35 PM

## 2023-12-14 NOTE — PROGRESS NOTES
500 [facility] Discharge Summary    Kade Puentes  1985  Case # [id number]    Location: [x] [place]     Admission Date: 7/27/2023    Date of last service: 12/14/2023    Therapist: Jeet Murray     Presenting Problem  Client is on probation for a robbery charge and has served her prison time. She is seeking treatment after being released from incarceration. Last drug screen: 12/7/2023 Results: negative    Diagnosis: F11.20 Opioid dependence-unspecified use and F15.20 Amphetamine and other psychostimulant dependence-unspecified use         Service:   assessment,   Individual 1 a week , Group assigned 1 a week , Urinalysis random, AA/NA 1-2 Biweekly, and Case Management as needed    Level of care at ADMISSIONS: 1 Outpatient Services    Level of care at DISCHARGE : NA    Client's Outcomes Treatment: (Review of participation, successes, insight, etc.)   Client completed treatment plan goals and objectives. She is currently involved with project woman waiting for housing. She is still looking for stable employment. Client continued to received the Vivitrol shot. Service History Appointments: 38 Scheduled 27 Kept 6 Cancelled 7 Did not show    Discharge Code: B completed treatment program    Reason for discharge: Client completed treatment goals and objectives. Recommendations/Referrals: Client is being referred back to probation. Client is encouraged to continue with her Vivitrol shot and remaining abstinent from all mood altering substances. Upon involuntary termination from service, client has received Client Rights as to their right to file an appeal.    Adult/Adolescent Discharge  Level of Care Criteria to be completed separately see attached form. Jeet Murray   12/14/2023 / 2:16 PM      Medical Director     GUSTAVO Fraser MD    Signature:

## 2023-12-19 ENCOUNTER — HOSPITAL ENCOUNTER (OUTPATIENT)
Dept: INFUSION THERAPY | Age: 38
Setting detail: INFUSION SERIES
Discharge: HOME | End: 2023-12-19
Payer: COMMERCIAL

## 2023-12-19 VITALS
TEMPERATURE: 98.6 F | OXYGEN SATURATION: 98 % | RESPIRATION RATE: 16 BRPM | HEART RATE: 72 BPM | SYSTOLIC BLOOD PRESSURE: 114 MMHG | DIASTOLIC BLOOD PRESSURE: 72 MMHG

## 2023-12-19 DIAGNOSIS — F11.20 OPIOID TYPE DEPENDENCE, CONTINUOUS (HCC): Primary | ICD-10-CM

## 2023-12-19 DIAGNOSIS — F15.20 AMPHETAMINE DEPENDENCE (HCC): ICD-10-CM

## 2023-12-19 PROCEDURE — 96372 THER/PROPH/DIAG INJ SC/IM: CPT

## 2023-12-19 PROCEDURE — 6360000002 HC RX W HCPCS: Performed by: PHYSICAL MEDICINE & REHABILITATION

## 2023-12-19 RX ADMIN — NALTREXONE 380 MG: KIT at 11:55

## 2023-12-19 NOTE — PROGRESS NOTES
Ambulatory to unit room 6 for Vivatrol. Orientated to unit. Procedure and plan of care explained. Questions answered. Understanding verbalized. Tolerated  well. Reviewed discharge instructions, understanding verbalized. Copies of AVS declined to take home. Patient discharged home. Down to exit per self. Orders Placed This Encounter   Medications    naltrexone (VIVITROL) injection 380 mg     Order Specific Question:   Is naltrexone being ordered for alcohol dependence? Answer:   No     Order Specific Question:   Has patient been opioid-free (including tramadol) for at least 10 days as determined by urinalysis? Answer:   Yes     Order Specific Question:   Has the patient been given/passed a naloxone or oral naltrexone challenge test?     Answer:    Yes

## 2023-12-19 NOTE — DISCHARGE INSTRUCTIONS
Vivatrol Therapy  Make sure you come as directed by your doctor. Do not miss a dose. Notify doctor for rash, hives, itching, or any unusual problems. Other Instructions  Continue home meds, diet and activity  Call your Doctor for any specific questions or problems  If you have any problems and are unable to contact your doctor, go to the Emergency Room. Thank you for choosing [facility]. It is a pleasure to serve you.         Infusion Unit  [phone]  8AM-5PM

## 2023-12-20 ENCOUNTER — APPOINTMENT (OUTPATIENT)
Dept: PSYCHIATRY | Age: 38
End: 2023-12-20
Payer: COMMERCIAL

## 2023-12-21 ENCOUNTER — APPOINTMENT (OUTPATIENT)
Dept: PSYCHIATRY | Age: 38
End: 2023-12-21
Payer: COMMERCIAL

## 2023-12-27 ENCOUNTER — APPOINTMENT (OUTPATIENT)
Dept: PSYCHIATRY | Age: 38
End: 2023-12-27
Payer: COMMERCIAL

## 2023-12-28 ENCOUNTER — APPOINTMENT (OUTPATIENT)
Dept: PSYCHIATRY | Age: 38
End: 2023-12-28
Payer: COMMERCIAL

## 2024-01-16 ENCOUNTER — HOSPITAL ENCOUNTER (OUTPATIENT)
Dept: INFUSION THERAPY | Age: 39
Setting detail: INFUSION SERIES
Discharge: HOME | End: 2024-01-16
Payer: COMMERCIAL

## 2024-01-16 VITALS
RESPIRATION RATE: 16 BRPM | OXYGEN SATURATION: 99 % | TEMPERATURE: 97.3 F | SYSTOLIC BLOOD PRESSURE: 114 MMHG | DIASTOLIC BLOOD PRESSURE: 69 MMHG | HEART RATE: 68 BPM

## 2024-01-16 DIAGNOSIS — F15.20 AMPHETAMINE DEPENDENCE (HCC): ICD-10-CM

## 2024-01-16 DIAGNOSIS — F11.20 OPIOID TYPE DEPENDENCE, CONTINUOUS (HCC): Primary | ICD-10-CM

## 2024-01-16 PROCEDURE — 96372 THER/PROPH/DIAG INJ SC/IM: CPT

## 2024-01-16 PROCEDURE — 6360000002 HC RX W HCPCS: Performed by: PHYSICAL MEDICINE & REHABILITATION

## 2024-01-16 RX ADMIN — NALTREXONE 380 MG: KIT at 11:01

## 2024-01-19 NOTE — PROGRESS NOTES
500 [facility]        Individual  Progress Note    Location: [x] [place] [] [facility]                   Patient Name: Kade Puentes   : 1985     Case # :  [id number]  Therapist: JOSE Murray        Objective/Service/Time:     Client did not show for her session. Counselor called and left voice message and sent letter of intent.          Electronically signed by Jude Bell on 2023 at 2:27 PM Prescription sent.

## 2024-08-07 ENCOUNTER — APPOINTMENT (OUTPATIENT)
Dept: GENERAL RADIOLOGY | Age: 39
End: 2024-08-07
Payer: COMMERCIAL

## 2024-08-07 ENCOUNTER — HOSPITAL ENCOUNTER (EMERGENCY)
Age: 39
Discharge: HOME | End: 2024-08-07
Attending: EMERGENCY MEDICINE
Payer: COMMERCIAL

## 2024-08-07 VITALS
TEMPERATURE: 97.9 F | SYSTOLIC BLOOD PRESSURE: 119 MMHG | BODY MASS INDEX: 23.32 KG/M2 | RESPIRATION RATE: 16 BRPM | HEART RATE: 70 BPM | WEIGHT: 140 LBS | DIASTOLIC BLOOD PRESSURE: 76 MMHG | HEIGHT: 65 IN | OXYGEN SATURATION: 98 %

## 2024-08-07 DIAGNOSIS — R07.9 ACUTE CHEST PAIN: Primary | ICD-10-CM

## 2024-08-07 LAB
ALBUMIN SERPL-MCNC: 4.3 GM/DL (ref 3.4–5)
ALP BLD-CCNC: 38 IU/L (ref 40–128)
ALT SERPL-CCNC: 8 U/L (ref 10–40)
ANION GAP SERPL CALCULATED.3IONS-SCNC: 10 MMOL/L (ref 7–16)
AST SERPL-CCNC: 11 IU/L (ref 15–37)
BASOPHILS ABSOLUTE: 0 K/CU MM
BASOPHILS RELATIVE PERCENT: 0.2 % (ref 0–1)
BILIRUB SERPL-MCNC: 0.4 MG/DL (ref 0–1)
BUN SERPL-MCNC: 9 MG/DL (ref 6–23)
CALCIUM SERPL-MCNC: 9.3 MG/DL (ref 8.3–10.6)
CHLORIDE BLD-SCNC: 104 MMOL/L (ref 99–110)
CO2: 24 MMOL/L (ref 21–32)
CREAT SERPL-MCNC: 0.7 MG/DL (ref 0.6–1.1)
D-DIMER QUANTITATIVE: 0.3 UG/ML (FEU)
DIFFERENTIAL TYPE: ABNORMAL
EOSINOPHILS ABSOLUTE: 0.1 K/CU MM
EOSINOPHILS RELATIVE PERCENT: 0.9 % (ref 0–3)
GFR, ESTIMATED: >90 ML/MIN/1.73M2
GLUCOSE SERPL-MCNC: 96 MG/DL (ref 70–99)
HCT VFR BLD CALC: 39 % (ref 37–47)
HEMOGLOBIN: 12.9 GM/DL (ref 12.5–16)
IMMATURE NEUTROPHIL %: 0.2 % (ref 0–0.43)
LYMPHOCYTES ABSOLUTE: 3.1 K/CU MM
LYMPHOCYTES RELATIVE PERCENT: 34.3 % (ref 24–44)
MAGNESIUM: 2 MG/DL (ref 1.8–2.4)
MCH RBC QN AUTO: 31.9 PG (ref 27–31)
MCHC RBC AUTO-ENTMCNC: 33.1 % (ref 32–36)
MCV RBC AUTO: 96.3 FL (ref 78–100)
MONOCYTES ABSOLUTE: 0.6 K/CU MM
MONOCYTES RELATIVE PERCENT: 6.8 % (ref 0–4)
NEUTROPHILS ABSOLUTE: 5.2 K/CU MM
NEUTROPHILS RELATIVE PERCENT: 57.6 % (ref 36–66)
NUCLEATED RBC %: 0 %
PDW BLD-RTO: 12.3 % (ref 11.7–14.9)
PLATELET # BLD: 290 K/CU MM (ref 140–440)
PMV BLD AUTO: 10.3 FL (ref 7.5–11.1)
POTASSIUM SERPL-SCNC: 3.8 MMOL/L (ref 3.5–5.1)
RBC # BLD: 4.05 M/CU MM (ref 4.2–5.4)
SODIUM BLD-SCNC: 138 MMOL/L (ref 135–145)
TOTAL IMMATURE NEUTOROPHIL: 0.02 K/CU MM
TOTAL NUCLEATED RBC: 0 K/CU MM
TOTAL PROTEIN: 7.6 GM/DL (ref 6.4–8.2)
TROPONIN, HIGH SENSITIVITY: <6 NG/L (ref 0–14)
TROPONIN, HIGH SENSITIVITY: <6 NG/L (ref 0–14)
WBC # BLD: 9 K/CU MM (ref 4–10.5)

## 2024-08-07 PROCEDURE — 85025 COMPLETE CBC W/AUTO DIFF WBC: CPT

## 2024-08-07 PROCEDURE — 80053 COMPREHEN METABOLIC PANEL: CPT

## 2024-08-07 PROCEDURE — 6370000000 HC RX 637 (ALT 250 FOR IP): Performed by: EMERGENCY MEDICINE

## 2024-08-07 PROCEDURE — 71045 X-RAY EXAM CHEST 1 VIEW: CPT

## 2024-08-07 PROCEDURE — 99285 EMERGENCY DEPT VISIT HI MDM: CPT

## 2024-08-07 PROCEDURE — 84484 ASSAY OF TROPONIN QUANT: CPT

## 2024-08-07 PROCEDURE — 93005 ELECTROCARDIOGRAM TRACING: CPT | Performed by: EMERGENCY MEDICINE

## 2024-08-07 PROCEDURE — 83735 ASSAY OF MAGNESIUM: CPT

## 2024-08-07 PROCEDURE — 85379 FIBRIN DEGRADATION QUANT: CPT

## 2024-08-07 RX ORDER — ASPIRIN 81 MG/1
324 TABLET, CHEWABLE ORAL ONCE
Status: COMPLETED | OUTPATIENT
Start: 2024-08-07 | End: 2024-08-07

## 2024-08-07 RX ADMIN — ASPIRIN 81 MG 324 MG: 81 TABLET ORAL at 10:49

## 2024-08-07 ASSESSMENT — PAIN SCALES - GENERAL: PAINLEVEL_OUTOF10: 0

## 2024-08-07 NOTE — ED NOTES
This RN called lab for repeat troponin. Jacqueline stating it is being processed and was just received. This RN stated that the trop was sent by me an hour ago and documented with a time stamp.  stating it is being ran like said prior. Charge RN notified.

## 2024-08-07 NOTE — ED TRIAGE NOTES
Pt c/o right sided chest pain that radiates under right arm. States it began last night and feels tight. Denies taking any aspirin or nitro. A/o and on room air.

## 2024-08-07 NOTE — ED PROVIDER NOTES
Troponin, High Sensitivity <6 0 - 14 ng/L   EKG 12 Lead   Result Value Ref Range    Ventricular Rate 63 BPM    Atrial Rate 63 BPM    P-R Interval 146 ms    QRS Duration 80 ms    Q-T Interval 402 ms    QTc Calculation (Bazett) 411 ms    P Axis 7 degrees    R Axis 0 degrees    T Axis 28 degrees    Diagnosis       Normal sinus rhythm  Normal ECG  When compared with ECG of 06-AUG-2023 03:03,  No significant change was found  Confirmed by ERASMO MCNAMARA ([id number]) on 2024 10:10:31 AM         Radiographs (if obtained):  [] The following radiograph was interpreted by myself in the absence of a radiologist:  [x] Radiologist's Report reviewed at time of ED visit:  XR CHEST PORTABLE    Result Date: 2024  PROCEDURE: X-ray Chest 1 view Clinical Indication: Chest pain TECHNIQUE: AP view of the chest were performed. COMPARISON: CR chest: 2022 FINDINGS: Lungs: No acute infiltrate. Pleural spaces: No pneumothorax. Heart/Mediastinum: The aorta is atherosclerotic. Left ventricular prominence is present. Bones/Joints: Mild degenerative changes are noted of the glenohumeral joints and thoracic spine. Free air: None Tubes lines devices: None     No infiltrate is identified. Electronically signed by Alejandra Bishop MD       CC/HPI Summary, DDx, ED Course, and Reassessment:   Patient has been given aspirin on arrival here.  EKG obtained and is nonacute.  Labs are reviewed.  No troponin elevation to suggest ACS.      History: 1  EC  Patient Age: 1  *Risk factors for Atherosclerotic disease: Cigarette smoking; Positive family History  Risk Factors: 1  Troponin: 0  Heart Score Total: 2    D-dimer obtained given radiation of discomfort.  Is this is not elevated no further workup undertaken for PE versus TAD.  Chest x-ray is reassuring with no mediastinal widening, infiltrate or effusion.    Remaining labs are unremarkable.    History from : Patient      Patient was given the following medications:  Medications    aspirin chewable tablet 324 mg (324 mg Oral Given 8/7/24 1049)       EKG (if obtained): (All EKG's are interpreted by myself in the absence of a cardiologist) sinus rhythm at 63.  Left axis with good R wave progression.  No ST elevation or depression.  No ectopy.  Appears similar to prior tracing      Disposition Considerations (tests considered but not done, Shared Decision Making, Pt Expectation of Test or Tx.):     Given patient's reassuring workup and low heart score here, I think patient is appropriate for outpatient management. Patient is given instructions regarding symptomatic care at home as well as return precautions. To call cardiology for follow up in 2-3 days. Patient verbalizes understanding of all instructions and is comfortable with the plan of care.         I am the Primary Clinician of Record.    Final Impression:  1. Acute chest pain      DISPOSITION Decision To Discharge 08/07/2024 03:42:23 PM      Patient referred to:  Ilan Escoto MD  [address] [place] [address]  [phone]    Schedule an appointment as soon as possible for a visit in 2 days      [facility] Emergency Department  [address]  [place] [address]  [phone]    If symptoms worsen    Discharge medications:  Discharge Medication List as of 8/7/2024  4:05 PM        (Please note that portions of this note may have been completed with a voice recognition program. Efforts were made to edit the dictations but occasionally words are mis-transcribed.)    DO Kong Casey Jenny L, DO  08/10/24 [id number]

## 2024-08-09 LAB
EKG ATRIAL RATE: 63 BPM
EKG DIAGNOSIS: NORMAL
EKG P AXIS: 7 DEGREES
EKG P-R INTERVAL: 146 MS
EKG Q-T INTERVAL: 402 MS
EKG QRS DURATION: 80 MS
EKG QTC CALCULATION (BAZETT): 411 MS
EKG R AXIS: 0 DEGREES
EKG T AXIS: 28 DEGREES
EKG VENTRICULAR RATE: 63 BPM

## 2024-08-09 PROCEDURE — 93010 ELECTROCARDIOGRAM REPORT: CPT | Performed by: INTERNAL MEDICINE

## 2024-08-10 ASSESSMENT — HEART SCORE: ECG: NORMAL

## 2025-01-23 ENCOUNTER — HOSPITAL ENCOUNTER (EMERGENCY)
Age: 40
Discharge: HOME | End: 2025-01-23
Attending: EMERGENCY MEDICINE
Payer: COMMERCIAL

## 2025-01-23 ENCOUNTER — APPOINTMENT (OUTPATIENT)
Dept: ULTRASOUND IMAGING | Age: 40
End: 2025-01-23
Payer: COMMERCIAL

## 2025-01-23 VITALS
HEIGHT: 64 IN | HEART RATE: 69 BPM | TEMPERATURE: 97.5 F | WEIGHT: 130 LBS | RESPIRATION RATE: 18 BRPM | DIASTOLIC BLOOD PRESSURE: 68 MMHG | BODY MASS INDEX: 22.2 KG/M2 | SYSTOLIC BLOOD PRESSURE: 117 MMHG | OXYGEN SATURATION: 97 %

## 2025-01-23 DIAGNOSIS — N93.9 VAGINAL BLEEDING: Primary | ICD-10-CM

## 2025-01-23 DIAGNOSIS — D25.9 UTERINE LEIOMYOMA, UNSPECIFIED LOCATION: ICD-10-CM

## 2025-01-23 DIAGNOSIS — F41.8 SITUATIONAL ANXIETY: ICD-10-CM

## 2025-01-23 LAB
ALBUMIN SERPL-MCNC: 4.2 G/DL (ref 3.4–5)
ALBUMIN/GLOB SERPL: 1.9 {RATIO} (ref 1.1–2.2)
ALP SERPL-CCNC: 39 U/L (ref 40–129)
ALT SERPL-CCNC: 6 U/L (ref 10–40)
ANION GAP SERPL CALCULATED.3IONS-SCNC: 9 MMOL/L (ref 9–17)
AST SERPL-CCNC: 13 U/L (ref 15–37)
BACTERIA URNS QL MICRO: ABNORMAL
BASOPHILS # BLD: 0.03 K/UL
BASOPHILS NFR BLD: 0 % (ref 0–1)
BILIRUB SERPL-MCNC: 0.7 MG/DL (ref 0–1)
BILIRUB UR QL STRIP: NEGATIVE
BUN SERPL-MCNC: 14 MG/DL (ref 7–20)
CALCIUM SERPL-MCNC: 9.5 MG/DL (ref 8.3–10.6)
CHLORIDE SERPL-SCNC: 104 MMOL/L (ref 99–110)
CLARITY UR: CLEAR
CO2 SERPL-SCNC: 26 MMOL/L (ref 21–32)
COLOR UR: YELLOW
CREAT SERPL-MCNC: 0.8 MG/DL (ref 0.6–1.1)
EOSINOPHIL # BLD: 0.07 K/UL
EOSINOPHILS RELATIVE PERCENT: 1 % (ref 0–3)
EPI CELLS #/AREA URNS HPF: 7 /HPF
ERYTHROCYTE [DISTWIDTH] IN BLOOD BY AUTOMATED COUNT: 12.3 % (ref 11.7–14.9)
GFR, ESTIMATED: 84 ML/MIN/1.73M2
GLUCOSE SERPL-MCNC: 134 MG/DL (ref 74–99)
GLUCOSE UR STRIP-MCNC: NEGATIVE MG/DL
HCG UR QL: NEGATIVE
HCT VFR BLD AUTO: 39.7 % (ref 37–47)
HGB BLD-MCNC: 13.1 G/DL (ref 12.5–16)
HGB UR QL STRIP.AUTO: ABNORMAL
IMM GRANULOCYTES # BLD AUTO: 0.01 K/UL
IMM GRANULOCYTES NFR BLD: 0 %
KETONES UR STRIP-MCNC: NEGATIVE MG/DL
LEUKOCYTE ESTERASE UR QL STRIP: NEGATIVE
LYMPHOCYTES NFR BLD: 2.95 K/UL
LYMPHOCYTES RELATIVE PERCENT: 41 % (ref 24–44)
MCH RBC QN AUTO: 31.9 PG (ref 27–31)
MCHC RBC AUTO-ENTMCNC: 33 G/DL (ref 32–36)
MCV RBC AUTO: 96.6 FL (ref 78–100)
MONOCYTES NFR BLD: 0.43 K/UL
MONOCYTES NFR BLD: 6 % (ref 0–4)
MUCOUS THREADS URNS QL MICRO: ABNORMAL
NEUTROPHILS NFR BLD: 52 % (ref 36–66)
NEUTS SEG NFR BLD: 3.7 K/UL
NITRITE UR QL STRIP: NEGATIVE
PH UR STRIP: 6 [PH] (ref 5–8)
PLATELET # BLD AUTO: 297 K/UL (ref 140–440)
PMV BLD AUTO: 10.3 FL (ref 7.5–11.1)
POTASSIUM SERPL-SCNC: 4.1 MMOL/L (ref 3.5–5.1)
PROT SERPL-MCNC: 6.3 G/DL (ref 6.4–8.2)
PROT UR STRIP-MCNC: NEGATIVE MG/DL
RBC # BLD AUTO: 4.11 M/UL (ref 4.2–5.4)
RBC #/AREA URNS HPF: 473 /HPF (ref 0–2)
SODIUM SERPL-SCNC: 138 MMOL/L (ref 136–145)
SP GR UR STRIP: 1.02 (ref 1–1.03)
UROBILINOGEN UR STRIP-ACNC: 1 EU/DL (ref 0–1)
WBC #/AREA URNS HPF: 0 /HPF (ref 0–5)
WBC OTHER # BLD: 7.2 K/UL (ref 4–10.5)

## 2025-01-23 PROCEDURE — 80053 COMPREHEN METABOLIC PANEL: CPT

## 2025-01-23 PROCEDURE — 99284 EMERGENCY DEPT VISIT MOD MDM: CPT

## 2025-01-23 PROCEDURE — 84703 CHORIONIC GONADOTROPIN ASSAY: CPT

## 2025-01-23 PROCEDURE — 85025 COMPLETE CBC W/AUTO DIFF WBC: CPT

## 2025-01-23 PROCEDURE — 93975 VASCULAR STUDY: CPT

## 2025-01-23 PROCEDURE — 6370000000 HC RX 637 (ALT 250 FOR IP): Performed by: EMERGENCY MEDICINE

## 2025-01-23 PROCEDURE — 81001 URINALYSIS AUTO W/SCOPE: CPT

## 2025-01-23 PROCEDURE — 76856 US EXAM PELVIC COMPLETE: CPT

## 2025-01-23 RX ORDER — HYDROXYZINE PAMOATE 25 MG/1
25 CAPSULE ORAL ONCE
Status: COMPLETED | OUTPATIENT
Start: 2025-01-23 | End: 2025-01-23

## 2025-01-23 RX ORDER — ACETAMINOPHEN 500 MG
1000 TABLET ORAL ONCE
Status: COMPLETED | OUTPATIENT
Start: 2025-01-23 | End: 2025-01-23

## 2025-01-23 RX ORDER — HYDROXYZINE HYDROCHLORIDE 25 MG/1
25 TABLET, FILM COATED ORAL EVERY 8 HOURS PRN
Qty: 30 TABLET | Refills: 0 | Status: SHIPPED | OUTPATIENT
Start: 2025-01-23 | End: 2025-02-02

## 2025-01-23 RX ADMIN — HYDROXYZINE PAMOATE 25 MG: 25 CAPSULE ORAL at 17:36

## 2025-01-23 RX ADMIN — ACETAMINOPHEN 1000 MG: 500 TABLET ORAL at 17:36

## 2025-01-23 ASSESSMENT — PAIN DESCRIPTION - LOCATION: LOCATION: ABDOMEN

## 2025-01-23 ASSESSMENT — PAIN DESCRIPTION - ORIENTATION: ORIENTATION: LOWER

## 2025-01-23 ASSESSMENT — PAIN DESCRIPTION - DESCRIPTORS: DESCRIPTORS: CRAMPING

## 2025-01-23 ASSESSMENT — LIFESTYLE VARIABLES
HOW OFTEN DO YOU HAVE A DRINK CONTAINING ALCOHOL: NEVER
HOW MANY STANDARD DRINKS CONTAINING ALCOHOL DO YOU HAVE ON A TYPICAL DAY: PATIENT DOES NOT DRINK

## 2025-01-23 ASSESSMENT — PAIN SCALES - GENERAL: PAINLEVEL_OUTOF10: 6

## 2025-01-23 NOTE — ED PROVIDER NOTES
[facility]    Emergency Department Encounter      Patient: Gill Montesinos  MRN: [id number]  : 1985  Date of Evaluation: 2025  ED Provider:  Trisha Donnelly DO    Triage Chief Complaint:   Vaginal Bleeding (X2 days, States she is changing a tampon every 15 minutes)    [place]:  Gill Montesinos is a 39 y.o. female who  has a past medical history of Amphetamine dependence (HCC), Epilepsy (HCC), and Opioid type dependence, continuous (HCC). and presents with heavy vaginal bleeding for the first.  She is going through approximately 30 super plus tampons.  Not on any blood thinners.  She has mild lower abdominal cramping and would like Tylenol for pain.  She also has mild anxiety regarding her situation and would like something for this.    Bleeding bad, like a period  Went though 30 super plus tampons.    No prior abnormality of periods.  + abdominal pain.    Hx of LAPAROSCOPY RT SALPING-OOPHERECTOMY ABLATION OF ENDOMETRIOSIS  with Dr. Barton in     ROS - see HPI, below listed is current ROS at time of my eval:   systems reviewed and negative except as above.     Past Medical History:   Diagnosis Date    Amphetamine dependence (HCC) 2023    Epilepsy (HCC)     Opioid type dependence, continuous (HCC) 2023     Past Surgical History:   Procedure Laterality Date    OVARY REMOVAL      R ovary    TUBAL LIGATION       Family History   Problem Relation Age of Onset    Diabetes Mother     Heart Disease Mother     Alcohol Abuse Mother     Heart Attack Father     Alcohol Abuse Father     Cancer Father     Heart Disease Father      Social History     Socioeconomic History    Marital status:      Spouse name: Not on file    Number of children: Not on file    Years of education: Not on file    Highest education level: Not on file   Occupational History    Not on file   Tobacco Use    Smoking status: Every Day     Current packs/day: 1.00     Average packs/day: 1 pack/day for 20.0

## 2025-01-27 ENCOUNTER — HOSPITAL ENCOUNTER (OUTPATIENT)
Dept: WOMENS IMAGING | Age: 40
Discharge: HOME | End: 2025-01-27
Payer: COMMERCIAL

## 2025-01-27 ENCOUNTER — HOSPITAL ENCOUNTER (OUTPATIENT)
Dept: ULTRASOUND IMAGING | Age: 40
Discharge: HOME | End: 2025-01-27
Payer: COMMERCIAL

## 2025-01-27 VITALS — WEIGHT: 130 LBS | HEIGHT: 64 IN | BODY MASS INDEX: 22.2 KG/M2

## 2025-01-27 DIAGNOSIS — N63.11 MASS OF UPPER OUTER QUADRANT OF RIGHT BREAST: ICD-10-CM

## 2025-01-27 DIAGNOSIS — N63.11 UNSPECIFIED LUMP IN THE RIGHT BREAST, UPPER OUTER QUADRANT: ICD-10-CM

## 2025-01-27 DIAGNOSIS — R92.8 ABNORMAL MAMMOGRAM: ICD-10-CM

## 2025-01-27 PROCEDURE — 76642 ULTRASOUND BREAST LIMITED: CPT

## 2025-01-27 PROCEDURE — G0279 TOMOSYNTHESIS, MAMMO: HCPCS

## 2025-02-18 ENCOUNTER — HOSPITAL ENCOUNTER (OUTPATIENT)
Dept: ULTRASOUND IMAGING | Age: 40
Discharge: HOME | End: 2025-02-18
Payer: COMMERCIAL

## 2025-02-18 ENCOUNTER — HOSPITAL ENCOUNTER (OUTPATIENT)
Dept: WOMENS IMAGING | Age: 40
Discharge: HOME | End: 2025-02-18
Payer: COMMERCIAL

## 2025-02-18 DIAGNOSIS — N64.59 ABNORMAL BREAST FINDING: ICD-10-CM

## 2025-02-18 PROCEDURE — 88305 TISSUE EXAM BY PATHOLOGIST: CPT

## 2025-02-18 PROCEDURE — 19084 BX BREAST ADD LESION US IMAG: CPT

## 2025-02-18 PROCEDURE — 88342 IMHCHEM/IMCYTCHM 1ST ANTB: CPT

## 2025-02-18 PROCEDURE — 77065 DX MAMMO INCL CAD UNI: CPT

## 2025-02-18 PROCEDURE — 2709999900 US BREAST BIOPSY W LOC DEVICE 1ST LESION RIGHT

## 2025-02-18 PROCEDURE — 88341 IMHCHEM/IMCYTCHM EA ADD ANTB: CPT

## 2025-02-20 LAB — SURGICAL PATHOLOGY REPORT: NORMAL

## 2025-06-11 ENCOUNTER — TRANSCRIBE ORDERS (OUTPATIENT)
Dept: ADMINISTRATIVE | Age: 40
End: 2025-06-11

## 2025-06-11 DIAGNOSIS — R92.8 ABNORMAL ULTRASOUND OF BREAST: Primary | ICD-10-CM

## 2025-06-12 ENCOUNTER — TRANSCRIBE ORDERS (OUTPATIENT)
Dept: ADMINISTRATIVE | Age: 40
End: 2025-06-12

## 2025-06-12 DIAGNOSIS — R92.8 ABNORMAL ULTRASOUND OF BREAST: Primary | ICD-10-CM

## 2025-06-25 ENCOUNTER — HOSPITAL ENCOUNTER (OUTPATIENT)
Dept: MRI IMAGING | Age: 40
Discharge: HOME | End: 2025-06-25
Payer: COMMERCIAL

## 2025-06-25 DIAGNOSIS — R92.8 ABNORMAL ULTRASOUND OF BREAST: ICD-10-CM

## 2025-06-25 PROCEDURE — A9577 INJ MULTIHANCE: HCPCS

## 2025-06-25 PROCEDURE — C8908 MRI W/O FOL W/CONT, BREAST,: HCPCS

## 2025-06-25 PROCEDURE — 6360000004 HC RX CONTRAST MEDICATION

## 2025-06-25 RX ADMIN — GADOBENATE DIMEGLUMINE 13 ML: 529 INJECTION, SOLUTION INTRAVENOUS at 14:30

## 2025-07-01 ENCOUNTER — TRANSCRIBE ORDERS (OUTPATIENT)
Dept: ADMINISTRATIVE | Age: 40
End: 2025-07-01

## 2025-07-01 DIAGNOSIS — R92.1 CALCIFICATION OF BREAST: Primary | ICD-10-CM
